# Patient Record
Sex: FEMALE | Race: BLACK OR AFRICAN AMERICAN | NOT HISPANIC OR LATINO | Employment: FULL TIME | URBAN - METROPOLITAN AREA
[De-identification: names, ages, dates, MRNs, and addresses within clinical notes are randomized per-mention and may not be internally consistent; named-entity substitution may affect disease eponyms.]

---
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
## 2020-01-25 NOTE — PROGRESS NOTES
[facility]         SUBJECTIVE   Kyle Steel is a 19 year old female with a PMH of:    Patient Active Problem List   Diagnosis     Chlamydia contact, treated     Patellofemoral disorder of left knee     presenting to clinic today for a STI check.  She states that she was tested for chlamydia and gonorrhea 1 week earlier and was negative at that time.  Since then, her partner has been having penile discharge and is being treated empirically for chlamydia and gonorrhea.  His results are not back yet.  She does not have any other partners but reports that he does.  She denies any symptoms.  She denies any vaginal discharge, pain or itching, or pelvic pain.    PMH, Medications and Allergies were reviewed and updated as needed.    ROS:  General: No fevers, chills  Head: No headache  CV: No chest pain or palpitations.  Resp: No shortness of breath.  No cough. No hemoptysis.  GI: No nausea, vomiting, constipation, diarrhea  : No urinary pains    Current Outpatient Medications   Medication Sig Dispense Refill     acetaminophen (TYLENOL) 325 MG tablet Take 1-2 tablets (325-650 mg) by mouth every 6 hours as needed for mild pain (Patient not taking: Reported on 8/23/2019) 100 tablet 0     etonogestrel (IMPLANON/NEXPLANON) 68 MG IMPL 1 each (68 mg) by Subdermal route continuous (Patient not taking: Reported on 8/23/2019)  0     ibuprofen (ADVIL/MOTRIN) 400 MG tablet Take 1 tablet (400 mg) by mouth every 8 hours as needed for moderate pain (Patient not taking: Reported on 8/23/2019) 120 tablet 0            OBJECTIVE:       Vitals:   Vitals:    09/23/19 1641   BP: 101/63   BP Location: Left arm   Patient Position: Sitting   Cuff Size: Adult Regular   Pulse: 82   Resp: 16   Temp: 98.4  F (36.9  C)   TempSrc: Oral   SpO2: 100%     Gen:  Well nourished and in no acute distress  HEENT: Extraocular movement intact.  CV:  RRR  - no murmurs noted   Pulm:  CTAB, no wheezes or crackles noted, good air entry   ABD:  Soft, nontender, no masses, no rebound, BS intact throughout, no hepatosplenomegaly  : Normal external genitalia, normal vaginal mucosa, normal-appearing cervix, copious white discharge  Extrem: No cyanosis, edema or clubbing  Psych: Euthymic           ASSESSMENT and PLAN:   Kyle was seen today for std and medication reconciliation.    Diagnoses and all orders for this visit:    Chlamydia contact  -     Wet Prep ([facility])  -     Chlamydia/Gono Amplified ([facility])      Return to clinic in as needed. Return sooner if develops new or worsening symptoms.    Options for treatment and/or follow-up care were reviewed with the patient was actively involved in the decision making process. Patient verbalized understanding and was in agreement with the plan.    The patient was seen by and discussed with Emerson Millan MD.    Rosalina Gilbert MD PGY2   [facility]

## 2020-02-26 ENCOUNTER — ALLIED HEALTH/NURSE VISIT (OUTPATIENT)
Dept: FAMILY MEDICINE | Facility: CLINIC | Age: 20
End: 2020-02-26
Payer: COMMERCIAL

## 2020-02-26 VITALS
TEMPERATURE: 98.5 F | WEIGHT: 132 LBS | OXYGEN SATURATION: 98 % | RESPIRATION RATE: 18 BRPM | BODY MASS INDEX: 20.67 KG/M2 | SYSTOLIC BLOOD PRESSURE: 107 MMHG | DIASTOLIC BLOOD PRESSURE: 67 MMHG | HEART RATE: 79 BPM

## 2020-02-26 DIAGNOSIS — Z30.42 ENCOUNTER FOR SURVEILLANCE OF INJECTABLE CONTRACEPTIVE: ICD-10-CM

## 2020-02-26 DIAGNOSIS — Z30.9 CONTRACEPTIVE MANAGEMENT: Primary | ICD-10-CM

## 2020-02-26 RX ORDER — MEDROXYPROGESTERONE ACETATE 150 MG/ML
150 INJECTION, SUSPENSION INTRAMUSCULAR
Status: ACTIVE | OUTPATIENT
Start: 2020-02-26

## 2020-02-26 RX ADMIN — MEDROXYPROGESTERONE ACETATE 150 MG: 150 INJECTION, SUSPENSION INTRAMUSCULAR at 12:01

## 2020-02-26 NOTE — NURSING NOTE
Clinic Administered Medication Documentation      Depo Provera Documentation    URINE HCG: not indicated    Depo-Provera Standing Order inclusion/exclusion criteria reviewed.   Patient meets: inclusion criteria     BP: 107/67  LAST PAP/EXAM: No results found for: PAP    Prior to injection, verified patient identity using patient's name and date of birth. Medication was administered. Please see MAR and medication order for additional information.     Was entire vial of medication used? Yes  Vial/Syringe: Single dose vial  Expiration Date:  1/21    Patient instructed to remain in clinic for 15 minutes.  NEXT INJECTION DUE: 5/13/20 - 5/27/20      Name of provider who requested the medication administration: Dr. Grier  Name of provider on site (faculty or community preceptor) at the time of performing the medication administration: Dr. Odonnell    Date of next administration: 5/14-6/11/20  Date of next office visit with provider to renew medication plan (must be seen annually): 12/9/20

## 2020-08-04 ENCOUNTER — OFFICE VISIT (OUTPATIENT)
Dept: FAMILY MEDICINE | Facility: CLINIC | Age: 20
End: 2020-08-04
Payer: COMMERCIAL

## 2020-08-04 VITALS
RESPIRATION RATE: 16 BRPM | SYSTOLIC BLOOD PRESSURE: 105 MMHG | DIASTOLIC BLOOD PRESSURE: 67 MMHG | WEIGHT: 132 LBS | OXYGEN SATURATION: 98 % | HEART RATE: 100 BPM | TEMPERATURE: 98.3 F | BODY MASS INDEX: 20 KG/M2 | HEIGHT: 68 IN

## 2020-08-04 DIAGNOSIS — K59.01 SLOW TRANSIT CONSTIPATION: ICD-10-CM

## 2020-08-04 DIAGNOSIS — Z86.19 HISTORY OF GONORRHEA: ICD-10-CM

## 2020-08-04 DIAGNOSIS — B00.9 HERPES SIMPLEX TYPE 2 INFECTION: ICD-10-CM

## 2020-08-04 DIAGNOSIS — Z32.00 PREGNANCY EXAMINATION OR TEST, PREGNANCY UNCONFIRMED: ICD-10-CM

## 2020-08-04 DIAGNOSIS — Z86.19 HISTORY OF CHLAMYDIA INFECTION: ICD-10-CM

## 2020-08-04 DIAGNOSIS — Z30.09 GENERAL COUNSELING FOR PRESCRIPTION OF ORAL CONTRACEPTIVES: Primary | ICD-10-CM

## 2020-08-04 PROBLEM — Z20.2 CHLAMYDIA CONTACT, TREATED: Status: RESOLVED | Noted: 2017-08-04 | Resolved: 2020-08-04

## 2020-08-04 PROBLEM — A54.9 GONORRHEA IN FEMALE: Status: RESOLVED | Noted: 2019-09-24 | Resolved: 2020-08-04

## 2020-08-04 PROBLEM — M22.2X2 PATELLOFEMORAL DISORDER OF LEFT KNEE: Status: RESOLVED | Noted: 2017-12-01 | Resolved: 2020-08-04

## 2020-08-04 PROBLEM — Z22.7 LTBI (LATENT TUBERCULOSIS INFECTION): Status: RESOLVED | Noted: 2019-12-03 | Resolved: 2020-08-04

## 2020-08-04 LAB — HCG UR QL: NEGATIVE

## 2020-08-04 RX ORDER — VALACYCLOVIR HYDROCHLORIDE 500 MG/1
500 TABLET, FILM COATED ORAL 2 TIMES DAILY
Qty: 6 TABLET | Refills: 0 | Status: SHIPPED | OUTPATIENT
Start: 2020-08-04 | End: 2021-07-20

## 2020-08-04 RX ORDER — DROSPIRENONE AND ETHINYL ESTRADIOL 0.03MG-3MG
1 KIT ORAL DAILY
Qty: 30 TABLET | Refills: 3 | Status: SHIPPED | OUTPATIENT
Start: 2020-08-04 | End: 2021-03-22

## 2020-08-04 RX ORDER — CALCIUM POLYCARBOPHIL 625 MG 625 MG/1
1 TABLET ORAL DAILY
Qty: 60 TABLET | Refills: 1 | Status: SHIPPED | OUTPATIENT
Start: 2020-08-04 | End: 2022-11-04

## 2020-08-04 RX ORDER — POLYETHYLENE GLYCOL 3350 17 G/17G
1 POWDER, FOR SOLUTION ORAL DAILY PRN
Qty: 507 G | Refills: 1 | Status: SHIPPED | OUTPATIENT
Start: 2020-08-04 | End: 2022-11-04

## 2020-08-04 ASSESSMENT — MIFFLIN-ST. JEOR: SCORE: 1410.87

## 2020-08-04 NOTE — PROGRESS NOTES
STEPAN Hitchcockkaty Steel is a 20 year old  female with a PMH significant for:     Patient Active Problem List   Diagnosis     Herpes simplex type 2 infection     She presents today for birth control consultation.    Patient is hoping to resume birth control today.  She has tried different options in the past including Nexplanon and Deppe shots.  She did not like either of these.  Per her report the Depo shot gave her cramps and heavy bleeding.  The Nexplanon gave her vaginal dryness and irritation.  She is not willing to try either of these today.  She is a non-smoker but has never tried a estrogen containing birth control.  No history of vascular disease, blood clots, hypertension, PCOS.  Is sexually active with one partner and is currently not using any form of contraception including barrier method.  Has a history of chlamydia and gonorrhea infections though it appears these were treated.  Is a little bit late today on her period, LMP estimated in the last week of June.  She did have a negative pregnancy test in clinic today.  She has had some cramps which make her feel like she is about to get her.  Though she is surprised she has not actually had any bleeding yet.  No vaginal discharge.    Also has a sore on the left side of her outer labia.  She had sexual intercourse this morning and states that it started to hurt after this.  She does not recall any trauma during this encounter.  Was not using any type of toy or in any unusual environment.  States she now has a red bump that is very tender to palpation and radiates down her leg.  Does have a history of herpes simplex 2 but this does not feel like an outbreak per her report.  Of note is not on any prophylaxis medication for her herpes outbreaks.  She is not concerned for STI, as above has had the same partner but does have a remote history of infection.    Would also like to discuss abdominal pain today.  She is had some periumbilical and left lower  quadrant pain for the past few days.  She states that it feels crampy in nature.  She does not have any dysuria or vaginal discharge.  Of note has a history of constipation states she has to bear down for about 30 minutes before she can poop.  She also only poops a few times a week, if that.  She has not noticed any darker discolorations in her stools.  Does not eat very much, states she does not have time and only eats 1 meal a day.    PMH, Medications and Allergies were reviewed and updated as needed.    Current Outpatient Medications:      acetaminophen (TYLENOL) 325 MG tablet, Take 1-2 tablets (325-650 mg) by mouth every 6 hours as needed for mild pain (Patient not taking: Reported on 8/23/2019), Disp: 100 tablet, Rfl: 0     etonogestrel (IMPLANON/NEXPLANON) 68 MG IMPL, 1 each (68 mg) by Subdermal route continuous (Patient not taking: Reported on 12/9/2019), Disp: , Rfl: 0     ibuprofen (ADVIL/MOTRIN) 400 MG tablet, Take 1 tablet (400 mg) by mouth every 8 hours as needed for moderate pain (Patient not taking: Reported on 8/23/2019), Disp: 120 tablet, Rfl: 0     rifampin (RIFADIN) 300 MG capsule, Take 2 capsules (600 mg) by mouth daily (Patient not taking: Reported on 12/9/2019), Disp: 60 capsule, Rfl: 3    Current Facility-Administered Medications:      medroxyPROGESTERone (DEPO-PROVERA) injection 150 mg, 150 mg, Intramuscular, Q90 Days, Kehinde Odonnell MD, 150 mg at 02/26/20 1201     medroxyPROGESTERone (DEPO-PROVERA) injection 150 mg, 150 mg, Intramuscular, Q90 Days, OfstedaReagan johnson MD, 150 mg at 12/09/19 1704    Review Of Systems  Skin: No new rashes or lesions  Eyes: No new changes in vision, no conjunctival irritation or erythema  Ears/Nose/Throat: No rhinorrhea or nasal congestion, no throat pain  Respiratory: No shortness of breath, dyspnea on exertion, cough, or hemoptysis  Cardiovascular: No chest pain or heart palpitations  Gastrointestinal: see HPI  Genitourinary: see HPI  Musculoskeletal: No  "joint edema, arthralgias or myalgias   Neurologic: No headache, tremor, numbness or weakness  Psychiatric: Moods have been stable   Hematologic/Lymphatic/Immunologic: No lymphadenopathy or unusual bleeding. No fevers, sweats, chills  Endocrine: No polyphagia or polydipsia           Physical Exam:     Vitals:    08/04/20 1524   BP: 105/67   BP Location: Right arm   Patient Position: Sitting   Cuff Size: Adult Regular   Pulse: 100   Resp: 16   Temp: 98.3  F (36.8  C)   TempSrc: Oral   SpO2: 98%   Weight: 59.9 kg (132 lb)   Height: 1.717 m (5' 7.6\")     Body mass index is 20.31 kg/m .    EXAM:  Constitutional: Healthy, alert and no distress  Neck: Neck supple. No adenopathy  Eyes: PERRLA, EOMI, conjunctiva are clear  ENT: No nasal discharge. Oropharynx clear with no erythema or exudates  Cardiovascular: Regular rate and rhythm. No murmurs, clicks gallops or rub  Respiratory: Normal respiratory rate and rhythm, lungs clear to auscultation. No wheezes or crackles  Abdomen:  +BS, soft, left lower quadrant tenderness without mass  : Chaperone offered and declined.  Left labia with erythematous edema, no lesion or papule.  Bimanual exam performed uterus normal size and no masses noted on either ovary  Extremities: No C/C/E in BLE. 2+DP pulses.  Joint exam without erythema, effusion and full ROM throughout  Skin: No rashes or lesion on exposed skin  Psychiatric: Mentation appears normal and affect normal/bright    No flowsheet data found.  PHQ-9 SCORE 12/9/2019   PHQ-9 Total Score 18     Results for orders placed or performed in visit on 08/04/20   HCG Qualitative Urine (UPT)  ([facility])     Status: None   Result Value Ref Range    HCG Qual Urine Negative Negative       Assessment and Plan     Kyle was seen today for contraception.    Diagnoses and all orders for this visit:    General counseling for prescription of oral contraceptives  Discussed birth control options in the exam room with the patient using the handout " provided at the [facility].  She does not want any type of NuvaRing as she finds vaginal inserts uncomfortable, has not tolerated shots or Nexplanon in the past.  Is interested in starting a combined OCP today.  Discussed that she would need to set a timer on her alarm phone to make sure that she takes this pill at the same time every day which she is agreeable to.  Also discussed that she will need to use protection for about 1 month after starting this for assurance as her body adapts.  She is somewhat interested in IUD but a little bit concerned about the invasiveness of this device at this time.  I talked her through how the procedure will go and she may choose to pursue this at a future date.  Negative pregnancy test today.  -     drospirenone-ethinyl estradiol (LIZ) 3-0.03 MG tablet; Take 1 tablet by mouth daily    Pregnancy examination or test, pregnancy unconfirmed  -     HCG Qualitative Urine (UPT)  ([facility])    Herpes simplex type 2 infection  Has history of herpes simplex 2 diagnosed by OB/GYN.  She has not had an outbreak for some time.  She states she can feel and these come on and does not have any prophylaxis medication.  She would like a prescription to have in case of an outbreak.  -     valACYclovir (VALTREX) 500 MG tablet; Take 1 tablet (500 mg) by mouth 2 times daily for 3 days    History of chlamydia infection  History of gonorrhea  History of positive results about 1 year ago we will recheck today to assure that the infection is cleared wspecially in the setting of abdominal pain and cramping  -     Chlamydia/Gono Amplified ([facility])    Slow transit constipation  At this time I am suspicious that her periumbilical and left lower quadrant pain is secondary to constipation as she endorses very poor bowel movement habits.  She is also gassy.  Will start a more judicious bowel regimen and follow-up as needed.  Her pain certainly could be attributed to something like an ovarian cyst and  starting an OCP should be helpful for this if that is the case.  -     calcium polycarbophil (FIBERCON) 625 MG tablet; Take 1 tablet (625 mg) by mouth daily  -     polyethylene glycol (MIRALAX) 17 GM/SCOOP powder; Take 17 g (1 capful) by mouth daily as needed for constipation      RTC if abdominal pain does not improve in coming weeks or if new or worsening symptoms.    Discussed with Dr. Lee Gilbert MD who is agreement with the assessment and plan.    Giulia Lyon MD PGY 2  [facility]      Patient Instructions   Birth control pill is at the pharmacy. Take this after your next period.    I recommend using back up contraception (condoms) for the first cycle to make sure you are tolerating the pills.    A antiviral has been sent to the pharmacy for when you get breakouts. You can always call to have this refilled.    Take the fiber pill (1-2) every day with lots of water. If you are unable to have a bowel movement drink a capful of Miralax.    If you don't feel better in the coming weeks call and come back to see me again.    Giulia Lyon MD        Options for treatment and/or follow-up care were reviewed with the patient. Kyle Steel was engaged and actively involved in the decision making process. She verbalized understanding of the options discussed and was satisfied with the final plan.

## 2020-08-04 NOTE — PROGRESS NOTES
Preceptor Attestation:    Patient seen and evaluated in person. I discussed the patient with the resident. I have verified the content of the note, which accurately reflects my assessment of the patient and the plan of care.   Supervising Physician:  eLe Gilbert MD.

## 2020-08-04 NOTE — PATIENT INSTRUCTIONS
Birth control pill is at the pharmacy. Take this after your next period.    I recommend using back up contraception (condoms) for the first cycle to make sure you are tolerating the pills.    A antiviral has been sent to the pharmacy for when you get breakouts. You can always call to have this refilled.    Take the fiber pill (1-2) every day with lots of water. If you are unable to have a bowel movement drink a capful of Miralax.    If you don't feel better in the coming weeks call and come back to see me again.    Giulia Lyon MD

## 2020-08-05 ENCOUNTER — TELEPHONE (OUTPATIENT)
Dept: FAMILY MEDICINE | Facility: CLINIC | Age: 20
End: 2020-08-05

## 2020-08-05 DIAGNOSIS — A74.9 CHLAMYDIA: ICD-10-CM

## 2020-08-05 LAB
C TRACH RRNA SPEC QL NAA+PROBE: POSITIVE
N GONORRHOEA RRNA SPEC QL NAA+PROBE: NEGATIVE

## 2020-08-05 RX ORDER — AZITHROMYCIN 500 MG/1
1000 TABLET, FILM COATED ORAL DAILY
Qty: 2 TABLET | Refills: 0 | Status: CANCELLED | COMMUNITY
Start: 2020-08-05

## 2020-08-05 RX ORDER — AZITHROMYCIN 500 MG/1
1000 TABLET, FILM COATED ORAL DAILY
Qty: 2 TABLET | Refills: 0 | COMMUNITY
Start: 2020-08-05 | End: 2020-09-21

## 2020-08-05 NOTE — TELEPHONE ENCOUNTER
Inquire about allergies and possibility of pregnancy in all patients.    Chlamydia:   Non-pregnant patients:    First line treatment:  azithromycin 1 gram po once    Second line (allergic to azithromycin):  doxycycline 100 mg po bid for 7 days    Third line options, ordered primarily based on ease of compliance and    tolerability:  1. Levofloxacin 500 mg po daily for 7 days  2. Ofloxacin 300 mg po bid for 7 days  3. Erythromycin base 500 mg po qid for 7 days   4. Erythromycin ethylsuccinate 800 mg qid for 7 days  Pregnant patients:   Azithromycin 1 gm once   Second line:  amoxicillin 500 mg po tid for 7 days   Third line:    1. Erythromycin base 500 mg po qid for 7 days    2. Erythromycin ethylsuccinate 800 mg po qid for 7 days  3. Erythromycin base 250 mg po qid for 14 days  4. Erthromycine ethylsuccinate 400 mg po qid for 14 days  Follow-up:    Non-pregnant:  Repeat testing in 3 months, sooner (not before 3 weeks) if symptoms persist after initial treatment, or patient is non-adherent. Also recommend testing for HIV and syphilis if not already done.    Pregnant:  test of cure recommended >3 weeks after treatment and repeated again in 3 months if in the first trimester at time of initial treatment    Counseling:    No sexual contact for 7 days after treatment with azithromycin is initiated or until after completion of antibiotics with other regimens.    All sexual partners within the past 60 days are recommended to be evaluated and treated by their provider or [facility] (which is free testing and treatment).  If no partner within 60 days, then the most recent sexual partner should be notified.     Notify patient that we are required by the [facility] to report all chlamydia infections, for the purpose of assistance with partner treatment.  We can list on this report the appropriate sexual partners that need to be treated, and the Dept of Health will contact them confidentially to  facilitate evaluation and treatment.    Source:  2010 CDC STD treatment guidelines. Updated with 2015 guidelines.

## 2020-09-21 DIAGNOSIS — A74.9 CHLAMYDIA: Primary | ICD-10-CM

## 2020-09-21 RX ORDER — AZITHROMYCIN 500 MG/1
1000 TABLET, FILM COATED ORAL DAILY
Qty: 2 TABLET | Refills: 0 | Status: SHIPPED | OUTPATIENT
Start: 2020-09-21 | End: 2021-07-20

## 2020-09-21 NOTE — PROGRESS NOTES
Patient diagnosed with chlamydia. Partner not able to obtain azithromycin so patient held on to her own azithromycin until he was treated. She lost her pills. Here with significant other to obtain expedited partner therapy. Medication sent to pharmacy.    Marianne Barbour MD  Discussed with Dr. Finn.

## 2021-06-19 NOTE — PROGRESS NOTES
[facility] Discharge Summary  Patient Name: Kyle Steel  Date: 8/9/2018  Referral Diagnosis: Patellofemoral disorder of left>right knee  Referring provider: Marcie Arreola MD  Visit Diagnosis:   1. Weakness of both lower extremities     2. Patellar malalignment syndrome, unspecified laterality     3. Chronic pain of both knees         Goals:  Status unknown/unmet;  Patient came for 2 of 6 prescribed visits.  Pt. will demonstrate/verbalize independence in self-management of condition in : 6 weeks  Pt. will be independent with home exercise program in : 6 weeks  Pt. will be able to walk : 60 minutes;with less pain;with less difficulty;for community mobility;for exercise/recreation;in 6 weeks;Comment  Comment:: less clicking and sensation of dragging leg sensation  Patient will sit: 60 minutes;for work;with less pain;in 6 weeks  Patient will increase : LEFS score;by _ points;for improved quality of function;for improved quality of life;in 6 weeks  by ___ points: 9    Patient was seen for 2 visits from 7/6/2018 to 7/10/2018 with one no show appointment and 3 outstanding appointments cancelled by her mother.  The patient attended therapy initially, but did not finish the therapy sessions prescribed.  Goals were not fully achieved. Explanation for goals not achieved: patient came for only 2 of 6 prescribed visits.  Patient received a home program LE stretches, pelvic/core/quad/HS strengthening  The patient discontinued therapy, did not return.  The patient was issued kinesiotape/theraband and instructed in proper usage.    Therapy will be discontinued at this time.  The patient will need a new referral to resume.    Thank you for your referral.  Selma Herrera  8/9/2018  10:01 AM      [facility] Daily Progress     Patient Name: Kyle Stele  PRECAUTIONS/RESTRICTIONS:  none  Date: 7/10/2018  Visit #: 2  PTA visit #:  0  Referral Diagnosis: Patellofemoral disorder of left>right  "knee  Referring provider: Marcie Arreola MD  Visit Diagnosis:     ICD-10-CM    1. Weakness of both lower extremities R29.898    2. Patellar malalignment syndrome, unspecified laterality M23.90    3. Chronic pain of both knees M25.561     M25.562     G89.29          Assessment:     HEP/POC compliance is  good .  Patient demonstrates understanding/independence with home program.  Response to Intervention limited but able to perform quad set without towel roll and SLR without pain  Patient is appropriate to continue with skilled physical therapy intervention, as indicated by initial plan of care.    Goal Status:  Pt. will demonstrate/verbalize independence in self-management of condition in : 6 weeks  Pt. will be independent with home exercise program in : 6 weeks  Pt. will be able to walk : 60 minutes;with less pain;with less difficulty;for community mobility;for exercise/recreation;in 6 weeks;Comment  Comment:: less clicking and sensation of dragging leg sensation  Patient will sit: 60 minutes;for work;with less pain;in 6 weeks  Patient will increase : LEFS score;by _ points;for improved quality of function;for improved quality of life;in 6 weeks  by ___ points: 9    Plan / Patient Education:     Continue with initial plan of care.  Progress with home program as tolerated.  see 1x/week for 4 more visits then reassess progress/needs.  Next:  Progressively add ,SL hip abduction/adduction, closed chain HS,Don Tiegney TKE, Step ups, SLS  Subjective:     Pain Ratin  Quite sore today but doesn't report any pain with exercises.  Removed tape  night.  Slight irritation  Compliant with HEP and no questions or difficulties    Response to PT/benefits:  None noted  Able to perform exercises without pain    Continued difficulties:  walking1-2 hours, sitting 30-60 minutes    Objective:     Gait:  Antalgic.  Today's Exercises:  OPT EXERCISES 7/10/2018   Exercise #1 Quad set without towel roll to 0o, 3x5\"-reviewed " "  Comment #1 SLR supine to 8\" in neutral then in ER, x2 each-reinstructed   Exercise #2 Clamshell, GTB 10x each-progressed   Comment #2 Bridge with pillow squeeze, 10x-DC   Exercise #3    Comment #3 Bridge marching, 5x-H   Exercise #4 Standing ITB stretch, 10\"x1 each-H   Comment #4 ITB SL trial   Exercise #5 Quad stretch standing, foot/leg on stool, 10\"x1 each-H   Tolerated ex well but pain with Don Janey TKE with GTB.      Treatment Today    TREATMENT MINUTES COMMENTS   Evaluation     Self-care/ Home management     Manual therapy     Neuromuscular Re-education     Therapeutic Activity     Therapeutic Exercises 25 See flow sheet   Gait training     Modality__________________                Total 25    Blank areas are intentional and mean the treatment did not include these items.       Selma Herrera  7/10/2018    "

## 2021-06-19 NOTE — PROGRESS NOTES
[facility] Certification Request    July 6, 2018      Patient: Kyle Steel  MR Number: [id number]  YOB: 2000  Date of Visit: 7/6/2018      Dear Dr. Marcie Arreola    Thank you for this referral.   We are seeing Kyle Steel for Physical Therapy of Left>Right Patellofemoral Pain Syndrome.    Medicare and/or Medicaid requires physician review and approval of the treatment plan. Please review the plan of care and verify that you agree with the therapy plan of care by co-signing this note.      Plan of Care  Authorization / Certification Start Date: 07/06/18  Authorization / Certification End Date: 08/17/18  Authorization / Certification Number of Visits: 6  Communication with: Referral Source  Patient Related Instruction: Nature of Condition;Treatment plan and rationale;Self Care instruction;Basis of treatment;Precautions;Next steps;Expected outcome  Times per Week: 1  Number of Weeks: 6  Number of Visits: 6  Discharge Planning: Independent HEP and self-management of symptom  Precautions / Restrictions : None  Therapeutic Exercise: Stretching;Strengthening  Neuromuscular Reeducation: kinesio tape;balance/proprioception;core  Manual Therapy: myofascial release  Modalities: other  Modalities: Disregard  Equipment: theraband;other  Equipment: kinesiotape    Goals:  Pt. will demonstrate/verbalize independence in self-management of condition in : 6 weeks  Pt. will be independent with home exercise program in : 6 weeks  Pt. will be able to walk : 60 minutes;with less pain;with less difficulty;for community mobility;for exercise/recreation;in 6 weeks;Comment  Comment:: less clicking and sensation of dragging leg sensation  Patient will sit: 60 minutes;for work;with less pain;in 6 weeks  Patient will increase : LEFS score;by _ points;for improved quality of function;for improved quality of life;in 6 weeks  by ___ points: 9      If you have any questions or concerns, please don't hesitate to  call.    Sincerely,      Selma Herrera, PT        Physician recommendation:     ___ Follow therapist's recommendation        ___ Modify therapy      *Physician co-signature indicates they certify the need for these services furnished within this plan and while under their care.            [facility]   Knee Initial Evaluation    Patient Name: Kyle Steel   Precautions/Restrictions:  None  Date of evaluation: 7/6/2018  Referral Diagnosis: Patellofemoral disorder of left>right knee  Referring provider: Marcie Arreola MD  Visit Diagnosis:     ICD-10-CM    1. Weakness of both lower extremities R29.898    2. Knee pain, bilateral M25.561     M25.562    3. Patellar malalignment syndrome, unspecified laterality M23.90    4. Chronic pain of both knees M25.561     M25.562     G89.29        Assessment:      Skilled PT is required to decrease pain, increase strength/function through exercise and education  Pt. is appropriate for skilled PT intervention as outlined in the Plan of Care (POC).  Pt. is a good candidate for skilled PT services to improve pain levels and function.    Goals:  Pt. will demonstrate/verbalize independence in self-management of condition in : 6 weeks  Pt. will be independent with home exercise program in : 6 weeks  Pt. will be able to walk : 60 minutes;with less pain;with less difficulty;for community mobility;for exercise/recreation;in 6 weeks;Comment  Comment:: less clicking and sensation of dragging leg sensation  Patient will sit: 60 minutes;for work;with less pain;in 6 weeks  Patient will increase : LEFS score;by _ points;for improved quality of function;for improved quality of life;in 6 weeks  by ___ points: 9    Patient's expectations/goals are realistic.    Barriers to Learning or Achieving Goals:  Chronicity of the problem.       Plan / Patient Instructions:      Plan of Care:   Authorization / Certification Start Date: 07/06/18  Authorization / Certification End Date:  08/17/18  Authorization / Certification Number of Visits: 6  Communication with: Referral Source  Patient Related Instruction: Nature of Condition;Treatment plan and rationale;Self Care instruction;Basis of treatment;Precautions;Next steps;Expected outcome  Times per Week: 1  Number of Weeks: 6  Number of Visits: 6  Discharge Planning: Independent HEP and self-management of symptom  Precautions / Restrictions : None  Therapeutic Exercise: Stretching;Strengthening  Neuromuscular Reeducation: kinesio tape;balance/proprioception;core  Manual Therapy: myofascial release  Modalities: other  Modalities: Disregard  Equipment: theraband;other  Equipment: kinesiotape    Plan of care and goals were established in collaboration with patient.    Plan for next visit: see 1x/week for 5 more visits then reassess progress/needs.  Next:  Progressively add ITB stretch, quad stetch,SL hip abduction, closed chain HS,Don Tiegney TKE, Step ups, SLS     Subjective:        Social information:   Living Situation:single family home and lives with others    Occupation:Summer Camp  at [employer]   Work Status:Working part time   Equipment Available: None    History of Present Illness:    Kyle is a 17 y.o. female who presents to therapy today with complaints of left>>right knee pain.. Date of onset/duration of symptoms is Feb 2017 noted with doing a flip in gymnastic. Onset was gradual. Symptoms are intermittent. She reports  a constant  history of similar symptoms since onset but none prior to Feb 2017. She describes their previous level of function as not limited    Pain Rating:3  Pain rating at best: 0  Pain rating at worst: 4  Pain description:ache/throb    Functional limitations are described as occurring with:   walking1-2 hours, sitting 30-60 minutes    Patient reports no benefit from  from any measures.       Objective:      Note: Items left blank indicates the item was not performed or not indicated at the time  of the evaluation.    Patient Outcome Measures :    Lower Extremity Functional Scale (_/80): 52   Scores range from 0-80, where a score of 80 represents maximum function. The minimal clinically important difference is a positive change of 9 points.    Knee Examination  1. Weakness of both lower extremities     2. Knee pain, bilateral     3. Patellar malalignment syndrome, unspecified laterality     4. Chronic pain of both knees       Precautions/Restrictions:  None  Involved Side: left>>right  Posture Observation: Standing:  C-protraction, increased thoracic kyphosis, right shoulder/scap/left iliac crest high, left knee extended, mild bilat pes planus     General sitting posture is  poor slouched.  Assistive Device: None  Gait Observation: normal  Lumbar Clearing: not tested  Hip Clearing: Not tested    Knee ROM Within normal limits unless otherwise indicated     Date:  7/6/2018      AROM in degrees  Right   Left  Right   Left  Right   Left       Knee Flexion  (130 )   130   130                   Knee Extension  (0 )   +3   +3 pain                 PROM in degrees  Right   Left  Right   Left  Right   Left       Knee Flexion  (130 )   135   130 pain                   Knee Extension  (0 )   +3   +10                 LE Strength    Within normal limits unless otherwise indicated               Date:  7/6/2018     Strength (MMT/5)  Right   Left  Right   Left  Right   Left       Hip Flexion   5 5                   Hip Abduction   4   4                   Hip Adduction                         Hip Extension                         Hip Internal Rotation 5   5-                   Hip External Rotation   5   5-                   Knee Extension   5   5                   Knee Flexion   4   4                   Ankle Dorsiflexion                         Ankle Plantarflexion                       Flexibility:  Gastrocsoleus/HS WNL    Palpation:  Mild tenderness left superior medial patella and medial quad    No apparent edema    Knee  "Special Tests (+/-):  7/6/2018    Knee OA Cluster   Right   Left   Ligament Tests   Right   Left    1. > 49 y/o           Lachman   -   -    2. Stiffness > 30 min.           Anterior Drawer   -   -    3. Crepitus           Posterior Drawer   -   -    4. Bony tenderness           Posterior Sag          5. Bone enlargement           Valgus Stress   -   -    6. No warmth to the touch           Varus Stress   -   -     Meniscal Tests   Right   Left    Other   Right    Left       Louise's           Hugomor's             Joint line tenderness           Adalgisa             Thessaly Thomas Apley's                      Today's exercises:  Exercises:  Exercise #1: Quad set with towel roll to 0o, 5x5\"-H  Comment #1: SLR supine to 8\", x5 each- verbal to try in slight ER-H  Exercise #2: Clamshell, 10x each-H  Comment #2: Bridge with pillow squeeze, 10x-H  Exercise #3: Kinesiotape for medial quad, skin prep applied, wear/care instructed; HO issued.    Discomfort with recurvatum on left otherwise tolerated exercises well.    Treatment Today   7/6/2018    TREATMENT MINUTES COMMENTS   Evaluation 30 Knee   Self-care/ Home management     Manual therapy     Neuromuscular Re-education     Therapeutic Activity     Therapeutic Exercises 23 See flow sheet   Gait training     Modality__________________                Total 53    Blank areas are intentional and mean the treatment did not include these items.     PT Evaluation Code: (Please list factors)  Patient History/Comorbidities: see above  Examination: see above  Clinical Presentation: stable  Clinical Decision Making: low    Patient History/  Comorbidities Examination  (body structures and functions, activity limitations, and/or participation restrictions) Clinical Presentation Clinical Decision Making (Complexity)   No documented Comorbidities or personal factors 1-2 Elements Stable and/or uncomplicated Low   1-2 documented comorbidities or personal factor 3 Elements " Evolving clinical presentation with changing characteristics Moderate   3-4 documented comorbidities or personal factors 4 or more Unstable and unpredictable High                Selma Herrera  7/6/2018  9:30 AM

## 2021-07-20 ENCOUNTER — OFFICE VISIT (OUTPATIENT)
Dept: FAMILY MEDICINE | Facility: CLINIC | Age: 21
End: 2021-07-20
Payer: COMMERCIAL

## 2021-07-20 VITALS
DIASTOLIC BLOOD PRESSURE: 66 MMHG | TEMPERATURE: 99.5 F | SYSTOLIC BLOOD PRESSURE: 91 MMHG | OXYGEN SATURATION: 98 % | WEIGHT: 140 LBS | HEART RATE: 102 BPM | BODY MASS INDEX: 21.54 KG/M2 | RESPIRATION RATE: 16 BRPM

## 2021-07-20 DIAGNOSIS — J02.0 STREP THROAT: Primary | ICD-10-CM

## 2021-07-20 DIAGNOSIS — B00.9 HERPES SIMPLEX TYPE 2 INFECTION: ICD-10-CM

## 2021-07-20 PROCEDURE — 99213 OFFICE O/P EST LOW 20 MIN: CPT | Performed by: FAMILY MEDICINE

## 2021-07-20 RX ORDER — AMOXICILLIN 500 MG/1
1000 CAPSULE ORAL 2 TIMES DAILY
Qty: 28 CAPSULE | Refills: 0 | Status: SHIPPED | OUTPATIENT
Start: 2021-07-20 | End: 2021-07-27

## 2021-07-20 RX ORDER — VALACYCLOVIR HYDROCHLORIDE 500 MG/1
500 TABLET, FILM COATED ORAL 2 TIMES DAILY
Qty: 6 TABLET | Refills: 11 | Status: SHIPPED | OUTPATIENT
Start: 2021-07-20 | End: 2022-11-04

## 2021-07-20 NOTE — PROGRESS NOTES
Assessment & Plan     Herpes simplex type 2 infection  No outbreak at this time but she like to have prescriptions to take for future outbreaks.  This was given with instructions on proper use of this medication.  - valACYclovir (VALTREX) 500 MG tablet; Take 1 tablet (500 mg) by mouth 2 times daily for 3 days    Strep throat  Patient likely has strep throat with a fever, marked pharyngitis and tonsillitis, absence of cough.  We will treat empirically.  - amoxicillin (AMOXIL) 500 MG capsule; Take 2 capsules (1,000 mg) by mouth 2 times daily for 7 days    25 minutes spent on the date of the encounter doing patient visit and documentation       Vini Li MD  [facility]    Options for treatment and/or follow-up care were reviewed with the patient. Kyle Steel was engaged and actively involved in the decision making process. She verbalized understanding of the options discussed and was satisfied with the final plan.    Subjective: Kyle Steel is a 20 year old coming in with 2 concerns.  She is wanting a refill on medications for treating herpes simplex.  She is taken Valtrex before for recurrent outbreaks and is needing a refill of that.    She also has felt febrile now for 2 days.  She has had a sore throat and a weird feeling when she swallows.  She states that she has not had a rash where she had a cough.  She is not been exposed to anyone with similar symptoms.  She does have a frontal headache that she states is different from her usual migraines.  She does have some chills.  PMHX/PSHX/MEDS/ALLERGIES/SHX/FHX reviewed and updated in Epic.   ROS:   Resp: No shortness of breath. No cough. No hemoptysis.   GI: No nausea or vomiting.    Objective: BP 91/66   Pulse 102   Temp 99.5  F (37.5  C) (Oral)   Resp 16   Wt 63.5 kg (140 lb)   SpO2 98%   BMI 21.54 kg/m     Gen: Well nourished and in NAD   HEENT: Pharyngitis and tonsillitis noted bilaterally.  Tonsils are 3+.  CV: Mild  tachycardia without murmur  Pulm: Clear to auscultation without wheezing or crackles  ABD: soft, nontender, BS intact  Extrem: no cyanosis, edema or clubbing   Psych: Euthymic

## 2021-07-20 NOTE — LETTER
RETURN TO WORK/SCHOOL FORM    7/20/2021    Re: Kyle Steel  2000      To Whom It May Concern:     Kyle Steel was seen in clinic today. She may return to work without restrictions on Thursday 7/22/21                Vini Li MD  7/20/2021 2:44 PM

## 2021-09-15 ENCOUNTER — OFFICE VISIT (OUTPATIENT)
Dept: FAMILY MEDICINE | Facility: CLINIC | Age: 21
End: 2021-09-15
Payer: COMMERCIAL

## 2021-09-15 VITALS
OXYGEN SATURATION: 99 % | TEMPERATURE: 98.1 F | RESPIRATION RATE: 18 BRPM | HEART RATE: 67 BPM | SYSTOLIC BLOOD PRESSURE: 99 MMHG | WEIGHT: 143.6 LBS | BODY MASS INDEX: 22.09 KG/M2 | DIASTOLIC BLOOD PRESSURE: 68 MMHG

## 2021-09-15 DIAGNOSIS — F33.9 RECURRENT MAJOR DEPRESSIVE DISORDER, REMISSION STATUS UNSPECIFIED (H): Primary | ICD-10-CM

## 2021-09-15 DIAGNOSIS — F41.9 ANXIETY: ICD-10-CM

## 2021-09-15 PROCEDURE — 99213 OFFICE O/P EST LOW 20 MIN: CPT | Mod: GC

## 2021-09-15 RX ORDER — SERTRALINE HYDROCHLORIDE 25 MG/1
25 TABLET, FILM COATED ORAL DAILY
Qty: 30 TABLET | Refills: 0 | Status: CANCELLED | OUTPATIENT
Start: 2021-09-15

## 2021-09-15 ASSESSMENT — ANXIETY QUESTIONNAIRES
1. FEELING NERVOUS, ANXIOUS, OR ON EDGE: NEARLY EVERY DAY
3. WORRYING TOO MUCH ABOUT DIFFERENT THINGS: NEARLY EVERY DAY
GAD7 TOTAL SCORE: 18
5. BEING SO RESTLESS THAT IT IS HARD TO SIT STILL: NEARLY EVERY DAY
7. FEELING AFRAID AS IF SOMETHING AWFUL MIGHT HAPPEN: NOT AT ALL
6. BECOMING EASILY ANNOYED OR IRRITABLE: NEARLY EVERY DAY
2. NOT BEING ABLE TO STOP OR CONTROL WORRYING: NEARLY EVERY DAY

## 2021-09-15 ASSESSMENT — PATIENT HEALTH QUESTIONNAIRE - PHQ9
5. POOR APPETITE OR OVEREATING: NEARLY EVERY DAY
SUM OF ALL RESPONSES TO PHQ QUESTIONS 1-9: 22

## 2021-09-15 NOTE — PATIENT INSTRUCTIONS
Thank you for trusting us with your care today. We discussed having you set up an appt with our mental health providers so that we can start working on your mental health goals. Please also schedule a follow up appt with me or your PCP.

## 2021-09-15 NOTE — PROGRESS NOTES
"    Assessment & Plan     Recurrent major depressive disorder, remission status unspecified (H)  Anxiety  Patient has had depressive symptoms since 2018. Never treated with medication or psychotherapy. Score of 22 on PHQ-9 assessment. Patient requests emotional support animal form filled out for her future residential apartment in November. Discussed with patient that emotional support animal may not be the best treatment option for her at this time. We will try therapy first as she is reluctant to try medications at this time. Will also consider low dose zoloft to augment therapy.    -referral to mental health services placed   -patient instructed to stop at  to make appt.       Return in about 4 weeks (around 10/13/2021), or if symptoms worsen or fail to improve, for Follow up.    Rudi Barakat MD  [facility] JESSICA Wright is a 21 year old who presents for the following health issues: requesting support animal    HPI     Patient is a 20yo F with no significant past medical history coming in for evaluation of a need for support animal. She has had depressive symptoms since 2018. Worsened with life stressors and being alone. Patient was homeless July-October 2020. Was sleeping in her car. prev living with grandfather who moved in with her mother which patient has had a migdalia relationship with. Mother is alcoholic. Patient started work at the age of 14 due to mother being absent. Reports family history of substance abuse. Patient says she tries hard to not take medications or substances because her family has \"addictive gene\". Currently she denies any suicidal ideation or attempts. No history of suicide attempts. Patient has never tried medication for depression, had referral for therapy but failed through, would consider pyschotherapy in the future. Housing program also has counselors for mental health needs. Patient is very aware when she needs additional support. 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 She likes to help people.    History of panic attacks, patient goes for walks to calm down. Would like a puppy to help her control the anxiety and depression worsened with living alone.       Review of Systems   Constitutional, HEENT, cardiovascular, pulmonary, gi and gu systems are negative, except as otherwise noted.      Objective    BP 99/68   Pulse 67   Temp 98.1  F (36.7  C) (Oral)   Resp 18   Wt 65.1 kg (143 lb 9.6 oz)   LMP 08/31/2021 (Approximate)   SpO2 99%   BMI 22.09 kg/m    Body mass index is 22.09 kg/m .  Physical Exam   GENERAL: healthy, alert and no distress  NECK: no adenopathy, no asymmetry, masses, or scars and thyroid normal to palpation  RESP: lungs clear to auscultation - no rales, rhonchi or wheezes  CV: regular rate and rhythm, normal S1 S2, no S3 or S4, no murmur, click or rub, no peripheral edema and peripheral pulses strong  ABDOMEN: soft, nontender, no hepatosplenomegaly, no masses and bowel sounds normal  MS: no gross musculoskeletal defects noted, no edema    PHQ 9/15/2021   PHQ-9 Total Score 22   Q9: Thoughts of better off dead/self-harm past 2 weeks Not at all

## 2021-09-15 NOTE — PROGRESS NOTES
Preceptor Attestation:    I discussed the patient with the resident and evaluated the patient in person. I have verified the content of the note, which accurately reflects my assessment of the patient and the plan of care.   Supervising Physician:  Berna Flores MD.

## 2021-09-15 NOTE — NURSING NOTE
Primary Care PTSD Screen    In your life, have you ever had any experience that was so frightening, horrible, or upsetting that, in the past month, you...    1.) Have had nightmares about it or thought about it when you did not want to? NO    2.) Tried hard not to think about it or went out of your way to avoid situations that remind you of it? NO    3.) Were constantly on guard, watchful, or easily startled? NO    4.) Felt numb or detached from others, activities, or your surroundings? NO        Bipolar Screening    Have you experienced sustained periods of feeling uncharacteristically energetic? YES    Have you had periods of not sleeping, but not feeling tired? YES    Have you felt that your thoughts were racing and couldn't be slowed down? YES    Have you had periods where you were excessive in sexual interest, spending money, or taking unusual risks? YES

## 2021-09-16 ENCOUNTER — DOCUMENTATION ONLY (OUTPATIENT)
Dept: PSYCHOLOGY | Facility: CLINIC | Age: 21
End: 2021-09-16
Payer: COMMERCIAL

## 2021-09-16 ASSESSMENT — ANXIETY QUESTIONNAIRES: GAD7 TOTAL SCORE: 18

## 2021-09-16 NOTE — PROGRESS NOTES
Behavioral Health Team,    Patient is being referred for mental health services by their provider, Dr. Barakat.  Please advise if we are able to see patient for in house treatment or if a community option would be best.    Thank you,    Bianca Dominguez  9/16/2021

## 2021-09-21 NOTE — PROGRESS NOTES
Review of Dr. Barakat's order and note indicates that this is a referral for psychiatry to address depressed mood.  Review of EPIC indicates one prior MH referral from Dr. Gilbert 12/9/19 for therapy to address anxiety but patient did not follow up at that time.  Ms. Steel did request letter for emotional support animal letter at the visit with Dr. Barakat but this was deferred.  Although MH order requests psychiatry, review of the note indicates that Dr. Barakat discussed scheduling therapy at [facility].  Dr. Barakat also recommended f/u with her around 10/13/21 but this is not scheduled.  Ms. Steel does have a history of homelessness and current housing program does have MH counselors available if needed per Dr. Barakat's note.  No safety concerns noted at the last visit.    PHQ 12/9/2019 9/15/2021   PHQ-9 Total Score 18 22   Q9: Thoughts of better off dead/self-harm past 2 weeks Not at all Not at all     Given this history, I am wondering how motivated Ms. Steel may be for therapy at this time, but will ask our referral team to reach out to her to see if she is interested in scheduling for therapy with one of the fellows.  We should be clear if the question arises, that attending therapy at [facility] does not guarantee support for an emotional support animal but that could be discussed down the road once care is well established (several attended visits).  Both fellows appear to have openings within the next couple of weeks.  Will also ask that our referral team encourage scheduling recommended follow up with Dr. Barakat at the time of outreach.    Let me know if you have questions or would like additional follow up from me. Thanks!      Eliane Kennedy, Ph.D.,     Disclaimer  The above treatment recommendations are based on consultation with the patient's primary care provider and a review of relevant information in EPIC. I have not personally examined the patient. All recommendations should be implemented with  considerations of the patient's relevant prior history and current clinical status. Please contact me with any questions about the care of this patient.

## 2021-09-30 NOTE — PROGRESS NOTES
09/30/21- no answer, lvm     11/29/21- no answer, lvm. Currently our fellows schedules are full. If patient returns call I will offer follow up appt with PCP during a time a  provider is in [place].     Bianca Dominguez

## 2022-03-23 ENCOUNTER — HOSPITAL ENCOUNTER (EMERGENCY)
Facility: CLINIC | Age: 22
Discharge: HOME | End: 2022-03-23
Attending: EMERGENCY MEDICINE | Admitting: EMERGENCY MEDICINE
Payer: COMMERCIAL

## 2022-03-23 VITALS
TEMPERATURE: 98 F | DIASTOLIC BLOOD PRESSURE: 65 MMHG | BODY MASS INDEX: 22.31 KG/M2 | HEART RATE: 87 BPM | SYSTOLIC BLOOD PRESSURE: 108 MMHG | WEIGHT: 145 LBS | RESPIRATION RATE: 16 BRPM | OXYGEN SATURATION: 100 %

## 2022-03-23 DIAGNOSIS — R10.84 ABDOMINAL PAIN, GENERALIZED: ICD-10-CM

## 2022-03-23 DIAGNOSIS — K59.00 CONSTIPATION, UNSPECIFIED CONSTIPATION TYPE: ICD-10-CM

## 2022-03-23 LAB
ALBUMIN SERPL-MCNC: 4.1 G/DL (ref 3.4–5)
ALP SERPL-CCNC: 54 U/L (ref 40–150)
ALT SERPL W P-5'-P-CCNC: 20 U/L (ref 0–50)
ANION GAP SERPL CALCULATED.3IONS-SCNC: 6 MMOL/L (ref 3–14)
AST SERPL W P-5'-P-CCNC: 19 U/L (ref 0–45)
BASOPHILS # BLD AUTO: 0 10E3/UL (ref 0–0.2)
BASOPHILS NFR BLD AUTO: 1 %
BILIRUB SERPL-MCNC: 0.7 MG/DL (ref 0.2–1.3)
BUN SERPL-MCNC: 7 MG/DL (ref 7–30)
CALCIUM SERPL-MCNC: 9.4 MG/DL (ref 8.5–10.1)
CHLORIDE BLD-SCNC: 108 MMOL/L (ref 94–109)
CO2 SERPL-SCNC: 25 MMOL/L (ref 20–32)
CREAT SERPL-MCNC: 0.8 MG/DL (ref 0.52–1.04)
EOSINOPHIL # BLD AUTO: 0 10E3/UL (ref 0–0.7)
EOSINOPHIL NFR BLD AUTO: 1 %
ERYTHROCYTE [DISTWIDTH] IN BLOOD BY AUTOMATED COUNT: 13.2 % (ref 10–15)
GFR SERPL CREATININE-BSD FRML MDRD: >90 ML/MIN/1.73M2
GLUCOSE BLD-MCNC: 90 MG/DL (ref 70–99)
HCG SERPL QL: NEGATIVE
HCT VFR BLD AUTO: 41 % (ref 35–47)
HGB BLD-MCNC: 12.8 G/DL (ref 11.7–15.7)
HOLD SPECIMEN: NORMAL
IMM GRANULOCYTES # BLD: 0 10E3/UL
IMM GRANULOCYTES NFR BLD: 0 %
LIPASE SERPL-CCNC: 56 U/L (ref 73–393)
LYMPHOCYTES # BLD AUTO: 1.7 10E3/UL (ref 0.8–5.3)
LYMPHOCYTES NFR BLD AUTO: 40 %
MCH RBC QN AUTO: 28.3 PG (ref 26.5–33)
MCHC RBC AUTO-ENTMCNC: 31.2 G/DL (ref 31.5–36.5)
MCV RBC AUTO: 91 FL (ref 78–100)
MONOCYTES # BLD AUTO: 0.4 10E3/UL (ref 0–1.3)
MONOCYTES NFR BLD AUTO: 10 %
NEUTROPHILS # BLD AUTO: 2 10E3/UL (ref 1.6–8.3)
NEUTROPHILS NFR BLD AUTO: 48 %
NRBC # BLD AUTO: 0 10E3/UL
NRBC BLD AUTO-RTO: 0 /100
PLATELET # BLD AUTO: 272 10E3/UL (ref 150–450)
POTASSIUM BLD-SCNC: 3.4 MMOL/L (ref 3.4–5.3)
PROT SERPL-MCNC: 7.7 G/DL (ref 6.8–8.8)
RBC # BLD AUTO: 4.53 10E6/UL (ref 3.8–5.2)
SODIUM SERPL-SCNC: 139 MMOL/L (ref 133–144)
WBC # BLD AUTO: 4.2 10E3/UL (ref 4–11)

## 2022-03-23 PROCEDURE — 85025 COMPLETE CBC W/AUTO DIFF WBC: CPT | Performed by: EMERGENCY MEDICINE

## 2022-03-23 PROCEDURE — 82040 ASSAY OF SERUM ALBUMIN: CPT | Performed by: EMERGENCY MEDICINE

## 2022-03-23 PROCEDURE — 80053 COMPREHEN METABOLIC PANEL: CPT | Performed by: EMERGENCY MEDICINE

## 2022-03-23 PROCEDURE — 36415 COLL VENOUS BLD VENIPUNCTURE: CPT | Performed by: EMERGENCY MEDICINE

## 2022-03-23 PROCEDURE — 250N000013 HC RX MED GY IP 250 OP 250 PS 637: Performed by: EMERGENCY MEDICINE

## 2022-03-23 PROCEDURE — 83690 ASSAY OF LIPASE: CPT | Performed by: EMERGENCY MEDICINE

## 2022-03-23 PROCEDURE — 99283 EMERGENCY DEPT VISIT LOW MDM: CPT

## 2022-03-23 PROCEDURE — 84703 CHORIONIC GONADOTROPIN ASSAY: CPT | Performed by: EMERGENCY MEDICINE

## 2022-03-23 RX ORDER — MAGNESIUM CARB/ALUMINUM HYDROX 105-160MG
296 TABLET,CHEWABLE ORAL ONCE
Qty: 296 ML | Refills: 0 | Status: SHIPPED | OUTPATIENT
Start: 2022-03-23 | End: 2022-03-23

## 2022-03-23 RX ADMIN — DOCUSATE SODIUM 286 ML: 50 LIQUID ORAL at 09:34

## 2022-03-23 ASSESSMENT — ENCOUNTER SYMPTOMS
DIARRHEA: 0
VOMITING: 0
SHORTNESS OF BREATH: 0
ABDOMINAL DISTENTION: 1
NAUSEA: 1
ABDOMINAL PAIN: 1
CONSTIPATION: 1

## 2022-03-23 NOTE — ED TRIAGE NOTES
Pt presents for constipation. No Bm in 3 days. Chronic issues, we seen by UC. Told to take miralax by UC provided. Did not work.

## 2022-03-23 NOTE — ED PROVIDER NOTES
History     Chief Complaint:  Constipation      HPI   Kyle Steel is a 21 year old female who presents with abdominal pain and concerns about constipation.  She is been having pain over the past 6 days. It has been 3 days since last having a very small bowel movement after giving herself an enema.  She notes that she is feel bloated and distended.  She has nausea.  She has pain across her mid abdomen.  No fevers or chills.  She cannot remember her last normal bowel movement but typically gets her bowel movements when she has her menstrual cycle.  Denies any pain or burning with urination.  Has not had any vaginal discharge and is recently been treated for chlamydia.  She denies any prior abdominal surgeries.    Review of Systems   Respiratory: Negative for shortness of breath.    Cardiovascular: Negative for chest pain.   Gastrointestinal: Positive for abdominal distention, abdominal pain, constipation and nausea. Negative for diarrhea and vomiting.   All other systems reviewed and are negative.      Allergies:  Nka [No Known Allergies]      Medications:    calcium polycarbophil (FIBERCON) 625 MG tablet  polyethylene glycol (MIRALAX) 17 GM/SCOOP powder  OCNNER 3-0.03 MG tablet  valACYclovir (VALTREX) 500 MG tablet        Past Medical History:    Past Medical History:   Diagnosis Date     Chlamydia contact, treated 8/4/2017     Gonorrhea in female 9/24/2019     LTBI (latent tuberculosis infection) 12/3/2019     Patellofemoral disorder of left knee 12/1/2017     Patient Active Problem List    Diagnosis Date Noted     Chlamydia 08/05/2020     Priority: Medium     8/4/20 positive Chlamydia Tx ordered 8/5/20 retest for cure due 11/20  BTRN       Herpes simplex type 2 infection 08/04/2020     Priority: Medium        Past Surgical History:    No past surgical history on file.    Family History:    Family History   Problem Relation Age of Onset     Diabetes Maternal Grandmother      Diabetes Maternal Grandfather       Diabetes Paternal Grandmother      Diabetes Paternal Grandfather      Coronary Artery Disease No family hx of        Social History:  Lives with boyfriend    Physical Exam     Patient Vitals for the past 24 hrs:   BP Temp Pulse Resp SpO2 Weight   03/23/22 1028 -- -- -- -- 100 % 65.8 kg (145 lb)   03/23/22 1024 108/65 98  F (36.7  C) 87 16 -- --       Physical Exam  Eyes:  The pupils are equal and round    Conjunctivae and sclerae are normal  ENT:    The nose is normal    Pinnae are normal  CV:  Regular rate and rhythm     No edema  Resp:  Lungs are clear    Non-labored    No rales    No wheezing   GI:  Abdomen is soft with generalized tenderness, there is no rigidity    No distension    No rebound tenderness   MS:  Normal muscular tone    No asymmetric leg swelling  Skin:  No rash or acute skin lesions noted  Neuro:   Awake, alert.      Speech is normal and fluent.    Face is symmetric.     Moves all extremities      Emergency Department Course     Laboratory:  Labs Ordered and Resulted from Time of ED Arrival to Time of ED Departure   LIPASE - Abnormal       Result Value    Lipase 56 (*)    CBC WITH PLATELETS AND DIFFERENTIAL - Abnormal    WBC Count 4.2      RBC Count 4.53      Hemoglobin 12.8      Hematocrit 41.0      MCV 91      MCH 28.3      MCHC 31.2 (*)     RDW 13.2      Platelet Count 272      % Neutrophils 48      % Lymphocytes 40      % Monocytes 10      % Eosinophils 1      % Basophils 1      % Immature Granulocytes 0      NRBCs per 100 WBC 0      Absolute Neutrophils 2.0      Absolute Lymphocytes 1.7      Absolute Monocytes 0.4      Absolute Eosinophils 0.0      Absolute Basophils 0.0      Absolute Immature Granulocytes 0.0      Absolute NRBCs 0.0     COMPREHENSIVE METABOLIC PANEL - Normal    Sodium 139      Potassium 3.4      Chloride 108      Carbon Dioxide (CO2) 25      Anion Gap 6      Urea Nitrogen 7      Creatinine 0.80      Calcium 9.4      Glucose 90      Alkaline Phosphatase 54      AST 19       ALT 20      Protein Total 7.7      Albumin 4.1      Bilirubin Total 0.7      GFR Estimate >90     HCG QUALITATIVE PREGNANCY - Normal    hCG Serum Qualitative Negative           Emergency Department Course:    Interventions:  Medications   Enema Compound (docusate/mag cit/mineral oil/NaPhos) SIMPLE (286 mLs Rectal Given 3/23/22 2702)       Disposition:  The patient was discharged to home.    Impression & Plan      Medical Decision Making:  Kyle Steel is a 21 year old female who is here with abdominal pain, bloating and constipation.  She cannot remember her last normal bowel movement.  She did have a small bowel movement 3 days ago after using an enema at home.  She has felt increased bloating since then.  She said decreased oral intake.  On exam she has some generalized tenderness without focality.  She reports most of her pain is in the mid abdomen.  Laboratory work-up obtained and was negative.  She is given enema and reports that it did help with her pain and she did have some bowel movement.  She not having significant pain now but still feels that full sensation in her abdomen.  Given her history that is consistent with constipation and no other red flag symptoms on labs, recommended treatment with magnesium citrate at home and follow-up with PCP.  Discussed warning signs return precautions.  She was discharged home.      Diagnosis:    ICD-10-CM    1. Constipation, unspecified constipation type  K59.00    2. Abdominal pain, generalized  R10.84        Discharge Medications:  New Prescriptions    No medications on file         Scribe Disclosure:  Moe FAUSTIN MD, am serving as a scribe at 8:55 AM on 3/23/2022 to document services personally performed by Moe Krause MD based on my observations and the provider's statements to me.      Moe Krause MD  03/23/22 3415

## 2022-03-23 NOTE — DISCHARGE INSTRUCTIONS
Discharge Instructions  Abdominal Pain    Abdominal pain (belly pain) can be caused by many things. Your evaluation today does not show the exact cause for your pain. Your provider today has decided that it is unlikely your pain is due to a life threatening problem, or a problem requiring surgery or hospital admission. Sometimes those problems cannot be found right away, so it is very important that you follow up as directed.  Sometimes only the changes which occur over time allow the cause of your pain to be found.    Generally, every Emergency Department visit should have a follow-up clinic visit with either a primary or a specialty clinic/provider. Please follow-up as instructed by your emergency provider today. With abdominal pain, we often recommend very close follow-up, such as the following day.    ADULTS:  Return to the Emergency Department right away if:    You get an oral temperature above 102oF or as directed by your provider.  You have blood in your stools. This may be bright red or appear as black, tarry stools.    You keep vomiting (throwing up) or cannot drink liquids.  You see blood when you vomit.   You cannot have a bowel movement or you cannot pass gas.  Your stomach gets bloated or bigger.  Your skin or the whites of your eyes look yellow.  You faint.  You have bloody, frequent or painful urination (peeing).  You have new symptoms or anything that worries you.    CHILDREN:  Return to the Emergency Department right away if your child has any of the above-listed symptoms or the following:    Pushes your hand away or screams/cries when his/her belly is touched.  You notice your child is very fussy or weak.  Your child is very tired and is too tired to eat or drink.  Your child is dehydrated.  Signs of dehydration can be:  Significant change in the amount of wet diapers/urine.  Your infant or child starts to have dry mouth and lips, or no saliva (spit) or tears.    PREGNANT WOMEN:  Return to the  Emergency Department right away if you have any of the above-listed symptoms or the following:    You have bleeding, leaking fluid or passing tissue from the vagina.  You have worse pain or cramping, or pain in your shoulder or back.  You have vomiting that will not stop.  You have a temperature of 100oF or more.  Your baby is not moving as much as usual.  You faint.  You get a bad headache with or without eye problems and abdominal pain.  You have a seizure.  You have unusual discharge from your vagina and abdominal pain.    Abdominal pain is pretty common during pregnancy.  Your pain may or may not be related to your pregnancy. You should follow-up closely with your OB provider so they can evaluate you and your baby.  Until you follow-up with your regular provider, do the following:     Avoid sex and do not put anything in your vagina.  Drink clear fluids.  Only take medications approved by your provider.    MORE INFORMATION:    Appendicitis:  A possible cause of abdominal pain in any person who still has their appendix is acute appendicitis. Appendicitis is often hard to diagnose.  Testing does not always rule out early appendicitis or other causes of abdominal pain. Close follow-up with your provider and re-evaluations may be needed to figure out the reason for your abdominal pain.    Follow-up:  It is very important that you make an appointment with your clinic and go to the appointment.  If you do not follow-up with your primary provider, it may result in missing an important development which could result in permanent injury or disability and/or lasting pain.  If there is any problem keeping your appointment, call your provider or return to the Emergency Department.    Medications:  Take your medications as directed by your provider today.  Before using over-the-counter medications, ask your provider and make sure to take the medications as directed.  If you have any questions about medications, ask your  "provider.    Diet:  Resume your normal diet as much as possible, but do not eat fried, fatty or spicy foods while you have pain.  Do not drink alcohol or have caffeine.  Do not smoke tobacco.    Probiotics: If you have been given an antibiotic, you may want to also take a probiotic pill or eat yogurt with live cultures. Probiotics have \"good bacteria\" to help your intestines stay healthy. Studies have shown that probiotics help prevent diarrhea (loose stools) and other intestine problems (including C. diff infection) when you take antibiotics. You can buy these without a prescription in the pharmacy section of the store.     If you were given a prescription for medicine here today, be sure to read all of the information (including the package insert) that comes with your prescription.  This will include important information about the medicine, its side effects, and any warnings that you need to know about.  The pharmacist who fills the prescription can provide more information and answer questions you may have about the medicine.  If you have questions or concerns that the pharmacist cannot address, please call or return to the Emergency Department.       Remember that you can always come back to the Emergency Department if you are not able to see your regular provider in the amount of time listed above, if you get any new symptoms, or if there is anything that worries you.      Discharge Instructions  Constipation  Constipation can cause severe cramping pain and your provider thinks this might be the cause of your abdominal pain (belly pain) today.  People usually recognize that they are constipated because they have difficulty having bowel movements, are not having bowel movements frequently enough, or are not having large enough bowel movements. Sometimes, especially in children or older people, you do not recognize that you are constipated until it becomes severe. The most common causes of constipation are a " lack of exercise and not eating enough fruits, vegetables, and whole grains. Constipation can also be a side effect of medications, such as narcotics, or may be caused by a disease of the digestive system.    Generally, every Emergency Department visit should have a follow-up clinic visit with either a primary or a specialty clinic/provider. Please follow-up as instructed by your emergency provider today. Sometimes, chronic constipation requires further testing to determine the cause. If you are over 50 years old, you may need a colonoscopy if you have not had one before.     Return to the Emergency Department if:  Your abdominal pain worsens or does not improve after a bowel movement.  You become very weak.  You get a temperature above 102oF or as directed by your provider.  You have blood in your stools (bright red or black, tarry stools).  You keep vomiting (throwing up) or cannot drink liquids.  Your see blood when you vomit.  Your stomach gets bloated or bigger.  You have new symptoms or anything that worries you.    What can I do to help myself?  If your provider gave you a cathartic medication, like magnesium citrate or GoLytely  (polyethylene glycol), you can expect to have cramps and gas pains after taking it. You can expect to have a number of bowel movements and even diarrhea (loose or watery stools) in the course of clearing your bowels.  You will know your bowels have been cleaned out after you pass clear liquid. The cramps and gas should let up after you have emptied your bowels. You may want to wait until morning to take this type of medication so you aren t up in the night.   Sometimes instead of cathartics, we recommend laxatives like milk of magnesia to move your bowels more slowly, or an enema to help the bowels to move. Read and follow the package directions, or follow your provider s instructions.  Once you have become very constipated, it takes time for your bowels to return to normal and you  need to be very careful to prevent becoming constipated again. Take a laxative if you do not move your bowels at least every two days.     Eat foods that have a lot of fiber. Good choices are fruits, vegetables, prune juice, apple juice, and high fiber cereal. Limit dairy products such as milk and cheese, since these can make constipation worse.   Drink plenty of water.   When you feel the need to go to the bathroom, go to the bathroom. Do not hold it.  Miralax , Metamucil , Colace , Senna or fiber supplements can be used daily.  Miralax  daily is often the best choice for children.  If you were given a prescription for medicine here today, be sure to read all of the information (including the package insert) that comes with your prescription.  This will include important information about the medicine, its side effects, and any warnings that you need to know about.  The pharmacist who fills the prescription can provide more information and answer questions you may have about the medicine.  If you have questions or concerns that the pharmacist cannot address, please call or return to the Emergency Department.   Remember that you can always come back to the Emergency Department if you are not able to see your regular provider in the amount of time listed above, if you get any new symptoms, or if there is anything that worries you.

## 2022-03-23 NOTE — ED NOTES
Bed: ED04  Expected date:   Expected time:   Means of arrival:   Comments:  [facility] [place] - 446 - 21 F constipation eta [id number]

## 2022-05-10 ENCOUNTER — OFFICE VISIT (OUTPATIENT)
Dept: URGENT CARE | Facility: URGENT CARE | Age: 22
End: 2022-05-10
Payer: COMMERCIAL

## 2022-05-10 VITALS
DIASTOLIC BLOOD PRESSURE: 68 MMHG | TEMPERATURE: 97.8 F | HEART RATE: 91 BPM | WEIGHT: 150 LBS | RESPIRATION RATE: 18 BRPM | BODY MASS INDEX: 23.08 KG/M2 | SYSTOLIC BLOOD PRESSURE: 110 MMHG

## 2022-05-10 DIAGNOSIS — N76.0 BV (BACTERIAL VAGINOSIS): ICD-10-CM

## 2022-05-10 DIAGNOSIS — B96.89 BV (BACTERIAL VAGINOSIS): ICD-10-CM

## 2022-05-10 DIAGNOSIS — N89.8 VAGINAL DISCHARGE: ICD-10-CM

## 2022-05-10 DIAGNOSIS — Z20.2 POSSIBLE EXPOSURE TO STD: ICD-10-CM

## 2022-05-10 DIAGNOSIS — Z11.3 SCREEN FOR STD (SEXUALLY TRANSMITTED DISEASE): Primary | ICD-10-CM

## 2022-05-10 LAB
CLUE CELLS: PRESENT
TRICHOMONAS, WET PREP: ABNORMAL
WBC'S/HIGH POWER FIELD, WET PREP: ABNORMAL
YEAST, WET PREP: ABNORMAL

## 2022-05-10 PROCEDURE — 87591 N.GONORRHOEAE DNA AMP PROB: CPT | Performed by: PHYSICIAN ASSISTANT

## 2022-05-10 PROCEDURE — 99214 OFFICE O/P EST MOD 30 MIN: CPT | Mod: 25 | Performed by: PHYSICIAN ASSISTANT

## 2022-05-10 PROCEDURE — 87491 CHLMYD TRACH DNA AMP PROBE: CPT | Performed by: PHYSICIAN ASSISTANT

## 2022-05-10 PROCEDURE — 96372 THER/PROPH/DIAG INJ SC/IM: CPT | Performed by: PHYSICIAN ASSISTANT

## 2022-05-10 PROCEDURE — 87210 SMEAR WET MOUNT SALINE/INK: CPT | Performed by: PHYSICIAN ASSISTANT

## 2022-05-10 RX ORDER — METRONIDAZOLE 500 MG/1
500 TABLET ORAL 2 TIMES DAILY
Qty: 14 TABLET | Refills: 0 | Status: SHIPPED | OUTPATIENT
Start: 2022-05-10 | End: 2022-05-10

## 2022-05-10 RX ORDER — DOXYCYCLINE 100 MG/1
100 CAPSULE ORAL 2 TIMES DAILY
Qty: 20 CAPSULE | Refills: 0 | Status: SHIPPED | OUTPATIENT
Start: 2022-05-10 | End: 2022-11-04

## 2022-05-10 RX ORDER — METRONIDAZOLE 7.5 MG/G
1 GEL VAGINAL DAILY
Qty: 35 G | Refills: 0 | Status: SHIPPED | OUTPATIENT
Start: 2022-05-10 | End: 2022-05-17

## 2022-05-10 NOTE — PROGRESS NOTES
Assessment & Plan     Screen for STD (sexually transmitted disease)  STD pending  Wet prep positive for BV    - Wet prep - Clinic Collect  - Chlamydia trachomatis PCR [DCC311]  - Neisseria gonorrhoeae PCR [[id number]]  - INJECTION INTRAMUSCULAR OR SUB-Q    BV (bacterial vaginosis)  You have a vaginal infection called bacterial vaginosis (BV). Both good and bad bacteria are present in a healthy vagina. BV occurs when these bacteria get out of balance. The number of bad bacteria increase. And the number of good bacteria decrease. BV is linked with sexual activity, but it's not a sexually transmitted infection (STI).   BV may or may not cause symptoms. If symptoms do occur, they can include:     Thin, gray, milky-white, or sometimes green discharge    Unpleasant odor or  fishy  smell    Itching, burning, or pain in or around the vagina      - metroNIDAZOLE (METROGEL) 0.75 % vaginal gel; Place 1 applicator (5 g) vaginally daily for 7 days  - INJECTION INTRAMUSCULAR OR SUB-Q    Vaginal discharge  Treatment for gonorrhea and chlamydia today  - INJECTION INTRAMUSCULAR OR SUB-Q    Possible exposure to STD  Treated with rocephin and doxy today to cover for chlamydia and gonorrhea    - cefTRIAXone (ROCEPHIN) injection 500 mg  - doxycycline monohydrate (MONODOX) 100 MG capsule; Take 1 capsule (100 mg) by mouth 2 times daily  - INJECTION INTRAMUSCULAR OR SUB-Q        At today's visit with Kyle Steel , we discussed results, diagnosis, medications and formulated a plan.  We also discussed red flags for immediate return to clinic/ER, as well as indications for follow up if no improvement. Patient understood and agreed to plan. Kyle Steel was discharged with stable vitals and has no further questions.     No follow-ups on file.    Jose L Corley, [facility], PA-C  [facility] URGENT [facility]    Boston Wright is a 21 year old who presents for the following health issues     HPI     Kyle Steel, 21 year old,  female presents to the urgent care today with:   Urgent Care (Possible yeast )  possible exposure to STD and wants tested and treated    Review of Systems   Constitutional, HEENT, cardiovascular, pulmonary, gi and gu systems are negative, except as otherwise noted.      Objective    /68   Pulse 91   Temp 97.8  F (36.6  C)   Resp 18   Wt 68 kg (150 lb)   BMI 23.08 kg/m    Body mass index is 23.08 kg/m .  Physical Exam   GENERAL: healthy, alert and no distress  ABDOMEN: soft, nontender, no hepatosplenomegaly, no masses and bowel sounds normal   (male): deferred  MS: no gross musculoskeletal defects noted, no edema  SKIN: no suspicious lesions or rashes  NEURO: Normal strength and tone, mentation intact and speech normal  PSYCH: mentation appears normal, affect normal/bright

## 2022-05-10 NOTE — PROGRESS NOTES
Clinic Administered Medication Documentation    Administrations This Visit     cefTRIAXone (ROCEPHIN) injection 500 mg     Admin Date  05/10/2022 Action  Given Dose  500 mg Route  Intramuscular Site  Left Ventrogluteal Administered By  Stanley Phillip CMA    Ordering Provider: Jose L Corley PA-C    Patient Supplied?: No

## 2022-05-11 LAB
C TRACH DNA SPEC QL NAA+PROBE: POSITIVE
N GONORRHOEA DNA SPEC QL NAA+PROBE: POSITIVE

## 2022-05-15 ENCOUNTER — HEALTH MAINTENANCE LETTER (OUTPATIENT)
Age: 22
End: 2022-05-15

## 2022-06-19 ENCOUNTER — APPOINTMENT (OUTPATIENT)
Dept: GENERAL RADIOLOGY | Facility: CLINIC | Age: 22
End: 2022-06-19
Attending: EMERGENCY MEDICINE
Payer: COMMERCIAL

## 2022-06-19 ENCOUNTER — HOSPITAL ENCOUNTER (EMERGENCY)
Facility: CLINIC | Age: 22
Discharge: HOME | End: 2022-06-20
Attending: EMERGENCY MEDICINE | Admitting: EMERGENCY MEDICINE
Payer: COMMERCIAL

## 2022-06-19 DIAGNOSIS — S67.10XA CRUSH INJURY TO FINGER, INITIAL ENCOUNTER: ICD-10-CM

## 2022-06-19 PROCEDURE — 73140 X-RAY EXAM OF FINGER(S): CPT | Mod: RT

## 2022-06-19 PROCEDURE — 29130 APPL FINGER SPLINT STATIC: CPT | Mod: F7

## 2022-06-19 PROCEDURE — 99284 EMERGENCY DEPT VISIT MOD MDM: CPT

## 2022-06-19 PROCEDURE — 250N000013 HC RX MED GY IP 250 OP 250 PS 637: Performed by: EMERGENCY MEDICINE

## 2022-06-19 RX ORDER — IBUPROFEN 600 MG/1
600 TABLET, FILM COATED ORAL ONCE
Status: COMPLETED | OUTPATIENT
Start: 2022-06-19 | End: 2022-06-19

## 2022-06-19 RX ORDER — ACETAMINOPHEN 325 MG/1
975 TABLET ORAL ONCE
Status: COMPLETED | OUTPATIENT
Start: 2022-06-19 | End: 2022-06-19

## 2022-06-19 RX ADMIN — IBUPROFEN 600 MG: 600 TABLET ORAL at 22:46

## 2022-06-19 RX ADMIN — ACETAMINOPHEN 975 MG: 325 TABLET ORAL at 22:46

## 2022-06-20 VITALS
SYSTOLIC BLOOD PRESSURE: 109 MMHG | HEIGHT: 68 IN | BODY MASS INDEX: 23.49 KG/M2 | TEMPERATURE: 97 F | DIASTOLIC BLOOD PRESSURE: 65 MMHG | HEART RATE: 84 BPM | RESPIRATION RATE: 14 BRPM | OXYGEN SATURATION: 96 % | WEIGHT: 155 LBS

## 2022-06-20 ASSESSMENT — ENCOUNTER SYMPTOMS
WOUND: 1
NUMBNESS: 1

## 2022-06-20 NOTE — ED PROVIDER NOTES
"  History     Chief Complaint:  Hand Injury       HPI   Kyle Steel is a 21 year old female who presents with pain to her right middle finger after slamming it in a car door.  She is complaining of mild to moderate pain, her tetanus is up-to-date.  Pain is aggravated by palpation and movement.  There is an abrasion however no subungual hematoma, the nail is intact.    ROS:  Review of Systems   Skin: Positive for wound.   Neurological: Positive for numbness.   All other systems reviewed and are negative.      Allergies:  Nka [No Known Allergies]     Medications:    calcium polycarbophil (FIBERCON) 625 MG tablet  doxycycline monohydrate (MONODOX) 100 MG capsule  polyethylene glycol (MIRALAX) 17 GM/SCOOP powder  CONNER 3-0.03 MG tablet  valACYclovir (VALTREX) 500 MG tablet        Past Medical History:    Past Medical History:   Diagnosis Date     Chlamydia contact, treated 8/4/2017     Gonorrhea in female 9/24/2019     LTBI (latent tuberculosis infection) 12/3/2019     Patellofemoral disorder of left knee 12/1/2017       Past Surgical History:    No past surgical history on file.     Family History:    family history includes Diabetes in her maternal grandfather, maternal grandmother, paternal grandfather, and paternal grandmother.    Social History:   reports that she is a non-smoker but has been exposed to tobacco smoke. She has never used smokeless tobacco. She reports that she does not drink alcohol and does not use drugs.  [facility]     Physical Exam     Patient Vitals for the past 24 hrs:   BP Temp Temp src Pulse Resp SpO2 Height Weight   06/19/22 2241 109/65 97  F (36.1  C) Temporal 84 14 96 % 1.727 m (5' 8\") 70.3 kg (155 lb)        Physical Exam  General: Alert, interactive in mild distress  Head:  Scalp is atraumatic  Eyes:  The pupils are equal, round, and reactive to light    EOM's intact    No scleral icterus  ENT:      Nose:  The external nose is normal  Ears:  External ears are " normal     Neck:  Normal range of motion.      There is no rigidity.    Trachea is in the midline         CV:  Regular rate and rhythm    Brisk capillary refill distal to the injury  Resp:  Breath sounds are clear bilaterally    Non-labored, no retractions or accessory muscle use     MS:  Normal strength in all 4 extremities, tenderness to the distal phalanx of the right middle finger, limited range of motion of this finger secondary to pain  skin:  Warm and dry, abrasion on the dorsum of the right middle finger just proximal to the nail, no signs of a subungual hematoma.  Neuro: Sensation intact in the right middle finger  Psych:  Awake. Alert.  Normal affect.      Appropriate interactions.    Emergency Department Course   Imaging:  XR Finger Right G/E 2 Views   Final Result   IMPRESSION: Normal joint spaces and alignment. No fracture.            Report per radiology    Emergency Department Course:    Reviewed:  I reviewed nursing notes and vitals    Assessments:   I obtained history and examined the patient as noted above.    I rechecked the patient and explained findings.     Interventions:  Medications   acetaminophen (TYLENOL) tablet 975 mg (975 mg Oral Given 6/19/22 2246)   ibuprofen (ADVIL/MOTRIN) tablet 600 mg (600 mg Oral Given 6/19/22 2246)        Disposition:  The patient was discharged to home.     Impression & Plan    Medical Decision Making:  Following presentation history and physical examination were performed, findings are consistent with crush injury to the finger, there is no signs of neurovascular compromise.  There is a superficial abrasion but no lacerations requiring repair.  Wound was cleansed here and finger was placed in an AlumaFoam splint, she will be discharged home and follow-up with her primary care provider and return if new symptoms develop.  She was advised that she may lose the fingernail given the injury.    Diagnosis:    ICD-10-CM    1. Crush injury to finger, initial encounter   S67.10XA         6/20/2022   Trigger, Armando Thompson,*      Trigger, Armando Thompson MD  06/20/22 0017

## 2022-09-10 ENCOUNTER — HEALTH MAINTENANCE LETTER (OUTPATIENT)
Age: 22
End: 2022-09-10

## 2022-09-11 ENCOUNTER — APPOINTMENT (OUTPATIENT)
Dept: ULTRASOUND IMAGING | Facility: CLINIC | Age: 22
End: 2022-09-11
Attending: EMERGENCY MEDICINE
Payer: COMMERCIAL

## 2022-09-11 ENCOUNTER — HOSPITAL ENCOUNTER (EMERGENCY)
Facility: CLINIC | Age: 22
Discharge: HOME | End: 2022-09-11
Attending: EMERGENCY MEDICINE | Admitting: EMERGENCY MEDICINE
Payer: COMMERCIAL

## 2022-09-11 VITALS
TEMPERATURE: 97.6 F | HEIGHT: 67 IN | DIASTOLIC BLOOD PRESSURE: 79 MMHG | OXYGEN SATURATION: 99 % | BODY MASS INDEX: 24.01 KG/M2 | HEART RATE: 69 BPM | WEIGHT: 153 LBS | RESPIRATION RATE: 18 BRPM | SYSTOLIC BLOOD PRESSURE: 117 MMHG

## 2022-09-11 DIAGNOSIS — R10.9 ABDOMINAL CRAMPING: ICD-10-CM

## 2022-09-11 DIAGNOSIS — A59.9 TRICHOMONAS INFECTION: ICD-10-CM

## 2022-09-11 DIAGNOSIS — N93.9 VAGINAL BLEEDING: ICD-10-CM

## 2022-09-11 LAB
ALBUMIN SERPL-MCNC: 3.7 G/DL (ref 3.4–5)
ALP SERPL-CCNC: 46 U/L (ref 40–150)
ALT SERPL W P-5'-P-CCNC: 12 U/L (ref 0–50)
ANION GAP SERPL CALCULATED.3IONS-SCNC: 7 MMOL/L (ref 3–14)
AST SERPL W P-5'-P-CCNC: 12 U/L (ref 0–45)
BASOPHILS # BLD AUTO: 0 10E3/UL (ref 0–0.2)
BASOPHILS NFR BLD AUTO: 0 %
BILIRUB SERPL-MCNC: 0.6 MG/DL (ref 0.2–1.3)
BUN SERPL-MCNC: 15 MG/DL (ref 7–30)
CALCIUM SERPL-MCNC: 8.8 MG/DL (ref 8.5–10.1)
CHLORIDE BLD-SCNC: 106 MMOL/L (ref 94–109)
CLUE CELLS: ABNORMAL
CO2 SERPL-SCNC: 26 MMOL/L (ref 20–32)
CREAT SERPL-MCNC: 1.1 MG/DL (ref 0.52–1.04)
EOSINOPHIL # BLD AUTO: 0.1 10E3/UL (ref 0–0.7)
EOSINOPHIL NFR BLD AUTO: 1 %
ERYTHROCYTE [DISTWIDTH] IN BLOOD BY AUTOMATED COUNT: 12.9 % (ref 10–15)
GFR SERPL CREATININE-BSD FRML MDRD: 73 ML/MIN/1.73M2
GLUCOSE BLD-MCNC: 90 MG/DL (ref 70–99)
HCG SERPL QL: NEGATIVE
HCT VFR BLD AUTO: 35.2 % (ref 35–47)
HGB BLD-MCNC: 11.5 G/DL (ref 11.7–15.7)
IMM GRANULOCYTES # BLD: 0 10E3/UL
IMM GRANULOCYTES NFR BLD: 0 %
LYMPHOCYTES # BLD AUTO: 1.9 10E3/UL (ref 0.8–5.3)
LYMPHOCYTES NFR BLD AUTO: 37 %
MCH RBC QN AUTO: 29 PG (ref 26.5–33)
MCHC RBC AUTO-ENTMCNC: 32.7 G/DL (ref 31.5–36.5)
MCV RBC AUTO: 89 FL (ref 78–100)
MONOCYTES # BLD AUTO: 0.5 10E3/UL (ref 0–1.3)
MONOCYTES NFR BLD AUTO: 11 %
NEUTROPHILS # BLD AUTO: 2.5 10E3/UL (ref 1.6–8.3)
NEUTROPHILS NFR BLD AUTO: 51 %
NRBC # BLD AUTO: 0 10E3/UL
NRBC BLD AUTO-RTO: 0 /100
PLATELET # BLD AUTO: 306 10E3/UL (ref 150–450)
POTASSIUM BLD-SCNC: 3.7 MMOL/L (ref 3.4–5.3)
PROT SERPL-MCNC: 7.1 G/DL (ref 6.8–8.8)
RBC # BLD AUTO: 3.96 10E6/UL (ref 3.8–5.2)
SODIUM SERPL-SCNC: 139 MMOL/L (ref 133–144)
TRICHOMONAS, WET PREP: PRESENT
WBC # BLD AUTO: 5.1 10E3/UL (ref 4–11)
WBC'S/HIGH POWER FIELD, WET PREP: ABNORMAL
YEAST, WET PREP: ABNORMAL

## 2022-09-11 PROCEDURE — 87210 SMEAR WET MOUNT SALINE/INK: CPT | Performed by: EMERGENCY MEDICINE

## 2022-09-11 PROCEDURE — 250N000011 HC RX IP 250 OP 636: Performed by: EMERGENCY MEDICINE

## 2022-09-11 PROCEDURE — 87591 N.GONORRHOEAE DNA AMP PROB: CPT | Performed by: EMERGENCY MEDICINE

## 2022-09-11 PROCEDURE — 84703 CHORIONIC GONADOTROPIN ASSAY: CPT | Performed by: EMERGENCY MEDICINE

## 2022-09-11 PROCEDURE — 96374 THER/PROPH/DIAG INJ IV PUSH: CPT

## 2022-09-11 PROCEDURE — 87491 CHLMYD TRACH DNA AMP PROBE: CPT | Performed by: EMERGENCY MEDICINE

## 2022-09-11 PROCEDURE — 80053 COMPREHEN METABOLIC PANEL: CPT | Performed by: EMERGENCY MEDICINE

## 2022-09-11 PROCEDURE — 250N000013 HC RX MED GY IP 250 OP 250 PS 637: Performed by: EMERGENCY MEDICINE

## 2022-09-11 PROCEDURE — 76856 US EXAM PELVIC COMPLETE: CPT

## 2022-09-11 PROCEDURE — 250N000009 HC RX 250: Performed by: EMERGENCY MEDICINE

## 2022-09-11 PROCEDURE — 96372 THER/PROPH/DIAG INJ SC/IM: CPT | Mod: XS | Performed by: EMERGENCY MEDICINE

## 2022-09-11 PROCEDURE — 85025 COMPLETE CBC W/AUTO DIFF WBC: CPT | Performed by: EMERGENCY MEDICINE

## 2022-09-11 PROCEDURE — 36415 COLL VENOUS BLD VENIPUNCTURE: CPT | Performed by: EMERGENCY MEDICINE

## 2022-09-11 PROCEDURE — 99285 EMERGENCY DEPT VISIT HI MDM: CPT | Mod: 25

## 2022-09-11 RX ORDER — DOXYCYCLINE 100 MG/1
100 CAPSULE ORAL EVERY 12 HOURS SCHEDULED
Status: DISCONTINUED | OUTPATIENT
Start: 2022-09-11 | End: 2022-09-12 | Stop reason: HOSPADM

## 2022-09-11 RX ORDER — METRONIDAZOLE 500 MG/1
500 TABLET ORAL ONCE
Status: COMPLETED | OUTPATIENT
Start: 2022-09-11 | End: 2022-09-11

## 2022-09-11 RX ORDER — KETOROLAC TROMETHAMINE 15 MG/ML
15 INJECTION, SOLUTION INTRAMUSCULAR; INTRAVENOUS ONCE
Status: COMPLETED | OUTPATIENT
Start: 2022-09-11 | End: 2022-09-11

## 2022-09-11 RX ORDER — DOXYCYCLINE 100 MG/1
100 CAPSULE ORAL 2 TIMES DAILY
Qty: 14 CAPSULE | Refills: 0 | Status: SHIPPED | OUTPATIENT
Start: 2022-09-12 | End: 2022-09-19

## 2022-09-11 RX ORDER — METRONIDAZOLE 500 MG/1
500 TABLET ORAL 2 TIMES DAILY
Qty: 14 TABLET | Refills: 0 | Status: SHIPPED | OUTPATIENT
Start: 2022-09-12 | End: 2022-09-19

## 2022-09-11 RX ADMIN — METRONIDAZOLE 500 MG: 500 TABLET ORAL at 22:10

## 2022-09-11 RX ADMIN — KETOROLAC TROMETHAMINE 15 MG: 15 INJECTION, SOLUTION INTRAMUSCULAR; INTRAVENOUS at 18:43

## 2022-09-11 RX ADMIN — LIDOCAINE HYDROCHLORIDE 500 MG: 10 INJECTION, SOLUTION EPIDURAL; INFILTRATION; INTRACAUDAL; PERINEURAL at 22:44

## 2022-09-11 RX ADMIN — DOXYCYCLINE HYCLATE 100 MG: 100 CAPSULE ORAL at 22:10

## 2022-09-11 ASSESSMENT — ACTIVITIES OF DAILY LIVING (ADL)
ADLS_ACUITY_SCORE: 35
ADLS_ACUITY_SCORE: 35

## 2022-09-11 ASSESSMENT — ENCOUNTER SYMPTOMS
WEAKNESS: 1
HEADACHES: 0
FEVER: 0
LIGHT-HEADEDNESS: 1

## 2022-09-11 NOTE — ED PROVIDER NOTES
"  History   Chief Complaint:  Vaginal Bleeding       HPI   Kyle Steel is a 22 year old female who presents with vaginal bleeding. The patient reports that she has been having vaginal bleeding and cramps since her surgery on ovarian cyst on august 22 2022. The surgery was performed by Dr. Priest at [facility]. She also repots having IUD placement during the surgery on august 22. She states that today she experienced severe intermittent cramp and episode of vaginal bleeding/passage of tissue. She uses tissue for the vaginal bleeding and notes that bleeding has significantly improved. She denies having headache, vision change, chest pain, fever, but she does indicate having light headedness,weakness, abdominal pain.  Reports sexual activity with 1 partner.      Review of Systems   Constitutional: Negative for fever.   Eyes: Negative for visual disturbance.   Cardiovascular: Negative for chest pain.   Gastrointestinal: Positive for abdominal pain (Cramping).   Genitourinary: Positive for vaginal bleeding.   Neurological: Positive for weakness and light-headedness. Negative for headaches.   All other systems reviewed and are negative.    Allergies:  The patient has no known allergies.     Medications:  Fibercon  Monodox  Miralax  Valtrex  Zofran  Naprosyn  Roxicodone  Deltasone  Norco    Past Medical History:     Herpes simplex type 2 infection   Chlamydia    Past Surgical History:    IUD placement     Family History:    Diabetes - grandmother  Diabetes - grandfather    Social History:  The patient presents to the ED alone. The patient reports to be non smoker.  PCP: Arab[facility]     Physical Exam     Patient Vitals for the past 24 hrs:   BP Temp Temp src Pulse Resp SpO2 Height Weight   09/11/22 2122 117/79 -- -- 69 -- -- -- --   09/11/22 1751 116/53 97.6  F (36.4  C) Temporal 85 18 99 % 1.702 m (5' 7\") 69.4 kg (153 lb)   09/11/22 1749 116/53 97.6  F (36.4  C) Temporal 83 20 100 % 1.702 m (5' 7\") 69.4 " kg (153 lb)       Physical Exam  General: Alert and cooperative with exam. Patient in mild distress. Normal mentation.  Head:  Scalp is NC/AT  Eyes:  No scleral icterus, PERRL  ENT:  The external nose and ears are normal.   Neck:  Normal range of motion without rigidity.  CV:  Regular rate and rhythm    No pathologic murmur   Resp:  Breath sounds are clear bilaterally    Non-labored, no retractions or accessory muscle use  GI:  Abdomen is soft, no distension, no tenderness. No peritoneal signs, mild LLQ TTP  MS:  No lower extremity edema   Skin:  Warm and dry, No rash or lesions noted.  Neuro: Oriented x 3. No gross motor deficits.  :  Chaperoned pelvic exam demonstrates normal external genital exam.  Cervical os visualized with IUD strings present.  Scant blood-tinged mucus in the vaginal canal without evidence of significant ongoing bleeding.      Emergency Department Course     Imaging:  US Pelvic Complete with Transvaginal   Final Result   IMPRESSION:   1.  Normal pelvic ultrasound.   2.  IUD within the endometrial canal. Endometrial thickness is 4 mm.                 Report per radiology    Laboratory:  Labs Ordered and Resulted from Time of ED Arrival to Time of ED Departure   COMPREHENSIVE METABOLIC PANEL - Abnormal       Result Value    Sodium 139      Potassium 3.7      Chloride 106      Carbon Dioxide (CO2) 26      Anion Gap 7      Urea Nitrogen 15      Creatinine 1.10 (*)     Calcium 8.8      Glucose 90      Alkaline Phosphatase 46      AST 12      ALT 12      Protein Total 7.1      Albumin 3.7      Bilirubin Total 0.6      GFR Estimate 73     CBC WITH PLATELETS AND DIFFERENTIAL - Abnormal    WBC Count 5.1      RBC Count 3.96      Hemoglobin 11.5 (*)     Hematocrit 35.2      MCV 89      MCH 29.0      MCHC 32.7      RDW 12.9      Platelet Count 306      % Neutrophils 51      % Lymphocytes 37      % Monocytes 11      % Eosinophils 1      % Basophils 0      % Immature Granulocytes 0      NRBCs per 100 WBC  0      Absolute Neutrophils 2.5      Absolute Lymphocytes 1.9      Absolute Monocytes 0.5      Absolute Eosinophils 0.1      Absolute Basophils 0.0      Absolute Immature Granulocytes 0.0      Absolute NRBCs 0.0     WET PREPARATION - Abnormal    Trichomonas Present (*)     Yeast Absent      Clue Cells Absent      WBCs/high power field 3+ (*)    HCG QUALITATIVE PREGNANCY - Normal    hCG Serum Qualitative Negative          Emergency Department Course:       Reviewed:  I reviewed nursing notes, vitals, past medical history and Care Everywhere    Assessments:  1811 I obtained history and examined the patient as noted above.    I rechecked the patient and explained findings.     Interventions:  1843 Toradol 15 mg IV  2210 Vibramycin 100 mg oral  2244 Rocephin 500 mg IM  2210 Flagyl 500 mg oral     Disposition:  The patient was discharged to home.     Impression & Plan     Medical Decision Making:  Kyle Steel is a 22 year old female who presents with mild pelvic pain and episode of vaginal bleeding/passage of tissue.   She is currently not pregnant as confirmed by laboratory testing.  They look overall well and have a reassuring exam.  A broad differential diagnosis was considered including colitis, appendicitis, intestinal cramping,  pyelonephritis, UTI, kidney stone, constipation, diverticulitis, endometriosis, obstruction, ovarian cyst (enlarged or ruptured), ovarian torsion,  PID, TOA, as most likely possibilities.    The workup in the ED is at this point negative.  No etiology for her pain is found at this point and my suspicion of an intraabdominal catastrophe or other worrisome etiology is very low.  Blood work is reassuring.  Transvaginal ultrasound without significant findings.  Pelvic exam demonstrates IUD in proper position without any evidence of significant ongoing bleeding.  Patient denies any urinary symptoms.  Patient's wet prep did return positive for trichomonas; gonorrhea and Chlamydia testing  pending.  Discussed empiric STI treatment with patient who is in agreement and she was provided IM ceftriaxone and discharged on Flagyl and doxycycline.  Discussed need for abstinence until antibiotic therapy completed and discussing her diagnosis with sexual partner/s so they too may get treated.  No evidence of PID on exam.  She was provided Toradol for mild abdominal discomfort with noted improvement.  Recommended continued use Tylenol/ibuprofen for pain control.  Patient notes that she has follow-up scheduled with OB/GYN tomorrow; encouraged her to keep this appointment for reevaluation.  Patient hemodynamically stable, well-appearing, and without significant abdominal discomfort at time of discharge.  I feel she is safe and appropriate for continued outpatient management.      Diagnosis:    ICD-10-CM    1. Trichomonas infection  A59.9    2. Vaginal bleeding  N93.9    3. Abdominal cramping  R10.9        Discharge Medications:  Discharge Medication List as of 9/11/2022 10:40 PM      START taking these medications    Details   doxycycline hyclate (VIBRAMYCIN) 100 MG capsule Take 1 capsule (100 mg) by mouth 2 times daily for 7 days, Disp-14 capsule, R-0, E-Prescribe      metroNIDAZOLE (FLAGYL) 500 MG tablet Take 1 tablet (500 mg) by mouth 2 times daily for 7 days, Disp-14 tablet, R-0, E-Prescribe             Scribe Disclosure:  I, MARY DEL CID, am serving as a scribe at 6:11 PM on 9/11/2022 to document services personally performed by Robe Bateman DO, based on my observations and the provider's statements to me.              Robe Bateman DO  09/12/22 [id number]

## 2022-09-11 NOTE — LETTER
September 11, 2022      To Whom It May Concern:      Kyle Steel was seen in our Emergency Department today, 09/11/22.  I expect her condition to improve over the next 2 days.  She may return to work/school when improved.    Sincerely,        Robe Bateman, DO

## 2022-09-12 LAB
C TRACH DNA SPEC QL NAA+PROBE: NEGATIVE
N GONORRHOEA DNA SPEC QL NAA+PROBE: NEGATIVE

## 2022-09-12 ASSESSMENT — ENCOUNTER SYMPTOMS: ABDOMINAL PAIN: 1

## 2022-09-12 NOTE — DISCHARGE INSTRUCTIONS
Take doxycycline and Flagyl as prescribed    Recommend 600 mg ibuprofen every 6 hours as needed for pain and/or vaginal bleeding  Tylenol as needed for additional pain relief

## 2022-11-04 ENCOUNTER — OFFICE VISIT (OUTPATIENT)
Dept: FAMILY MEDICINE | Facility: CLINIC | Age: 22
End: 2022-11-04
Payer: COMMERCIAL

## 2022-11-04 VITALS
WEIGHT: 151.6 LBS | DIASTOLIC BLOOD PRESSURE: 65 MMHG | BODY MASS INDEX: 23.74 KG/M2 | HEART RATE: 65 BPM | SYSTOLIC BLOOD PRESSURE: 107 MMHG | RESPIRATION RATE: 14 BRPM | TEMPERATURE: 98.8 F | OXYGEN SATURATION: 99 %

## 2022-11-04 DIAGNOSIS — R11.2 NAUSEA AND VOMITING, UNSPECIFIED VOMITING TYPE: Primary | ICD-10-CM

## 2022-11-04 DIAGNOSIS — N80.9 ENDOMETRIOSIS: ICD-10-CM

## 2022-11-04 LAB
ALBUMIN UR-MCNC: NEGATIVE MG/DL
APPEARANCE UR: CLEAR
BACTERIA #/AREA URNS HPF: ABNORMAL /HPF
BASOPHILS # BLD AUTO: 0 10E3/UL (ref 0–0.2)
BASOPHILS NFR BLD AUTO: 1 %
BILIRUB UR QL STRIP: NEGATIVE
COLOR UR AUTO: YELLOW
DEPRECATED S PYO AG THROAT QL EIA: NEGATIVE
EOSINOPHIL # BLD AUTO: 0.1 10E3/UL (ref 0–0.7)
EOSINOPHIL NFR BLD AUTO: 1 %
ERYTHROCYTE [DISTWIDTH] IN BLOOD BY AUTOMATED COUNT: 12.9 % (ref 10–15)
FLUAV AG SPEC QL IA: NEGATIVE
FLUBV AG SPEC QL IA: NEGATIVE
GLUCOSE UR STRIP-MCNC: NEGATIVE MG/DL
HCT VFR BLD AUTO: 35 % (ref 35–47)
HGB BLD-MCNC: 11.4 G/DL (ref 11.7–15.7)
HGB UR QL STRIP: ABNORMAL
IMM GRANULOCYTES # BLD: 0 10E3/UL
IMM GRANULOCYTES NFR BLD: 0 %
KETONES UR STRIP-MCNC: NEGATIVE MG/DL
LEUKOCYTE ESTERASE UR QL STRIP: NEGATIVE
LYMPHOCYTES # BLD AUTO: 2.2 10E3/UL (ref 0.8–5.3)
LYMPHOCYTES NFR BLD AUTO: 35 %
MCH RBC QN AUTO: 29.2 PG (ref 26.5–33)
MCHC RBC AUTO-ENTMCNC: 32.6 G/DL (ref 31.5–36.5)
MCV RBC AUTO: 90 FL (ref 78–100)
MONOCYTES # BLD AUTO: 0.6 10E3/UL (ref 0–1.3)
MONOCYTES NFR BLD AUTO: 10 %
MUCOUS THREADS #/AREA URNS LPF: PRESENT /LPF
NEUTROPHILS # BLD AUTO: 3.3 10E3/UL (ref 1.6–8.3)
NEUTROPHILS NFR BLD AUTO: 53 %
NITRATE UR QL: NEGATIVE
PH UR STRIP: 7 [PH] (ref 5–8)
PLATELET # BLD AUTO: 274 10E3/UL (ref 150–450)
RBC # BLD AUTO: 3.9 10E6/UL (ref 3.8–5.2)
RBC #/AREA URNS AUTO: ABNORMAL /HPF
SP GR UR STRIP: 1.02 (ref 1–1.03)
SQUAMOUS #/AREA URNS AUTO: ABNORMAL /LPF
UROBILINOGEN UR STRIP-ACNC: 0.2 E.U./DL
WBC # BLD AUTO: 6.3 10E3/UL (ref 4–11)
WBC #/AREA URNS AUTO: ABNORMAL /HPF

## 2022-11-04 PROCEDURE — U0003 INFECTIOUS AGENT DETECTION BY NUCLEIC ACID (DNA OR RNA); SEVERE ACUTE RESPIRATORY SYNDROME CORONAVIRUS 2 (SARS-COV-2) (CORONAVIRUS DISEASE [COVID-19]), AMPLIFIED PROBE TECHNIQUE, MAKING USE OF HIGH THROUGHPUT TECHNOLOGIES AS DESCRIBED BY CMS-2020-01-R: HCPCS | Performed by: PHYSICIAN ASSISTANT

## 2022-11-04 PROCEDURE — U0005 INFEC AGEN DETEC AMPLI PROBE: HCPCS | Performed by: PHYSICIAN ASSISTANT

## 2022-11-04 PROCEDURE — 99214 OFFICE O/P EST MOD 30 MIN: CPT | Mod: CS | Performed by: PHYSICIAN ASSISTANT

## 2022-11-04 PROCEDURE — 36415 COLL VENOUS BLD VENIPUNCTURE: CPT | Performed by: PHYSICIAN ASSISTANT

## 2022-11-04 PROCEDURE — 87804 INFLUENZA ASSAY W/OPTIC: CPT | Performed by: PHYSICIAN ASSISTANT

## 2022-11-04 PROCEDURE — 81001 URINALYSIS AUTO W/SCOPE: CPT

## 2022-11-04 PROCEDURE — 85025 COMPLETE CBC W/AUTO DIFF WBC: CPT | Performed by: PHYSICIAN ASSISTANT

## 2022-11-04 PROCEDURE — 87651 STREP A DNA AMP PROBE: CPT | Performed by: PHYSICIAN ASSISTANT

## 2022-11-04 RX ORDER — ONDANSETRON 4 MG/1
4 TABLET, ORALLY DISINTEGRATING ORAL EVERY 8 HOURS PRN
Qty: 10 TABLET | Refills: 0 | Status: SHIPPED | OUTPATIENT
Start: 2022-11-04

## 2022-11-04 NOTE — LETTER
[facility]  [address] [address]  Phone: [phone]  Fax: [phone]    November 4, 2022        Kyle Steel  [id number]  [place] [address]          To whom it may concern:    RE: Kyle Steel    She is excused from work for 11/4/2022.      Please contact me for questions or concerns.      Sincerely,        Noni Montoya PA-C

## 2022-11-05 LAB
GROUP A STREP BY PCR: NOT DETECTED
SARS-COV-2 RNA RESP QL NAA+PROBE: NEGATIVE

## 2022-11-05 NOTE — PROGRESS NOTES
Assessment & Plan:      Problem List Items Addressed This Visit    None  Visit Diagnoses     Nausea and vomiting, unspecified vomiting type    -  Primary    Relevant Medications    ondansetron (ZOFRAN ODT) 4 MG ODT tab    Other Relevant Orders    UA macro with reflex to Microscopic and Culture - Clinc Collect (Completed)    Urine Microscopic (Completed)    CBC with platelets and differential (Completed)    Streptococcus A Rapid Screen w/Reflex to PCR - Clinic Collect (Completed)    Influenza A & B Antigen - Clinic Collect (Completed)    Symptomatic; Yes; 10/31/2022 COVID-19 Virus (Coronavirus) by PCR Nose    Group A Streptococcus PCR Throat Swab    Endometriosis            Medical Decision Making  Patient with history of endometriosis presents with nausea and vomiting due to unknown cause.  Urine analysis is negative for UTI and CBC shows normal white blood cell count to rule out signs of worse infection.  Rapid strep and influenza are negative at this time.  Recommend trial of Zofran to help with nausea as needed.  There is impresses COVID-19.  Recommend patient follow-up with OB/GYN for further recommendations and control of discomfort from history of endometriosis.  Allergies and medication interactions reviewed.  Discussed signs of worsening symptoms and when to follow-up with PCP if no symptom improvement.     Subjective:      Kyle Steel is a 22 year old female with history of endometriosis, here for evaluation of nausea, vomiting, and headaches.  Onset of symptoms was 4 to 5 days ago.  Patient initially developed nausea, headaches, and sensitivity to smells.  Starting this morning patient had 2 episodes of emesis.  Patient otherwise denies fevers, sore throat, cough, rhinorrhea.  She does note lower abdominal cramping, but she states she has this every day due to her history of endometriosis.  Patient currently using IUD for contraception.     The following portions of the patient's history were reviewed  and updated as appropriate: allergies, current medications, and problem list.     Review of Systems  Pertinent items are noted in HPI.    Allergies  Allergies   Allergen Reactions     Nka [No Known Allergies]        Family History   Problem Relation Age of Onset     Diabetes Maternal Grandmother      Diabetes Maternal Grandfather      Diabetes Paternal Grandmother      Diabetes Paternal Grandfather      Coronary Artery Disease No family hx of        Social History     Tobacco Use     Smoking status: Never     Passive exposure: Yes     Smokeless tobacco: Never   Substance Use Topics     Alcohol use: No        Objective:      /65   Pulse 65   Temp 98.8  F (37.1  C) (Oral)   Resp 14   Wt 68.8 kg (151 lb 9.6 oz)   SpO2 99%   BMI 23.74 kg/m    General appearance - alert, well appearing, and in no distress and non-toxic  Ears - bilateral TM's and external ear canals normal  Nose - normal and patent, no erythema, discharge or polyps  Mouth - mucous membranes moist, pharynx normal without lesions  Neck - supple, no significant adenopathy  Chest - clear to auscultation, no wheezes, rales or rhonchi, symmetric air entry  Heart - normal rate, regular rhythm, normal S1, S2, no murmurs, rubs, clicks or gallops  Abdomen - Tenderness throughout the lower abdomen, otherwise abdomen soft, no masses organomegaly, normal bowel sounds  Back exam - No CVA tenderness     Lab & Imaging Results    Results for orders placed or performed in visit on 11/04/22   UA macro with reflex to Microscopic and Culture - Clinc Collect     Status: Abnormal    Specimen: Urine, Clean Catch   Result Value Ref Range    Color Urine Yellow Colorless, Straw, Light Yellow, Yellow    Appearance Urine Clear Clear    Glucose Urine Negative Negative, 1000 , >=2000 mg/dL    Bilirubin Urine Negative Negative    Ketones Urine Negative Negative, 160  mg/dL    Specific Gravity Urine 1.025 1.005 - 1.030    Blood Urine Trace (A) Negative    pH Urine 7.0 5.0 -  8.0    Protein Albumin Urine Negative Negative, 300 , >=2000 mg/dL    Urobilinogen Urine 0.2 0.2, 1.0 E.U./dL    Nitrite Urine Negative Negative    Leukocyte Esterase Urine Negative Negative   Urine Microscopic     Status: Abnormal   Result Value Ref Range    Bacteria Urine Few (A) None Seen /HPF    RBC Urine 2-5 (A) 0-2 /HPF /HPF    WBC Urine None Seen 0-5 /HPF /HPF    Squamous Epithelials Urine Few (A) None Seen /LPF    Mucus Urine Present (A) None Seen /LPF    Narrative    Urine Culture not indicated   CBC with platelets and differential     Status: Abnormal   Result Value Ref Range    WBC Count 6.3 4.0 - 11.0 10e3/uL    RBC Count 3.90 3.80 - 5.20 10e6/uL    Hemoglobin 11.4 (L) 11.7 - 15.7 g/dL    Hematocrit 35.0 35.0 - 47.0 %    MCV 90 78 - 100 fL    MCH 29.2 26.5 - 33.0 pg    MCHC 32.6 31.5 - 36.5 g/dL    RDW 12.9 10.0 - 15.0 %    Platelet Count 274 150 - 450 10e3/uL    % Neutrophils 53 %    % Lymphocytes 35 %    % Monocytes 10 %    % Eosinophils 1 %    % Basophils 1 %    % Immature Granulocytes 0 %    Absolute Neutrophils 3.3 1.6 - 8.3 10e3/uL    Absolute Lymphocytes 2.2 0.8 - 5.3 10e3/uL    Absolute Monocytes 0.6 0.0 - 1.3 10e3/uL    Absolute Eosinophils 0.1 0.0 - 0.7 10e3/uL    Absolute Basophils 0.0 0.0 - 0.2 10e3/uL    Absolute Immature Granulocytes 0.0 <=0.4 10e3/uL   Streptococcus A Rapid Screen w/Reflex to PCR - Clinic Collect     Status: Normal    Specimen: Throat; Swab   Result Value Ref Range    Group A Strep antigen Negative Negative   Influenza A & B Antigen - Clinic Collect     Status: Normal    Specimen: Nose; Swab   Result Value Ref Range    Influenza A antigen Negative Negative    Influenza B antigen Negative Negative    Narrative    Test results must be correlated with clinical data. If necessary, results should be confirmed by a molecular assay or viral culture.   CBC with platelets and differential     Status: Abnormal    Narrative    The following orders were created for panel order CBC  with platelets and differential.  Procedure                               Abnormality         Status                     ---------                               -----------         ------                     CBC with platelets and d...[[id number]]  Abnormal            Final result                 Please view results for these tests on the individual orders.       I personally reviewed these results and discussed findings with the patient.    The use of Dragon[employer] services was used to construct the content of this note; any grammatical errors are non-intentional. Please contact the author directly if you are in need of any clarification.

## 2022-11-07 ENCOUNTER — VIRTUAL VISIT (OUTPATIENT)
Dept: FAMILY MEDICINE | Facility: CLINIC | Age: 22
End: 2022-11-07
Payer: COMMERCIAL

## 2022-11-07 DIAGNOSIS — Z76.89 RETURN TO WORK EVALUATION: Primary | ICD-10-CM

## 2022-11-07 DIAGNOSIS — A08.4 VIRAL GASTROENTERITIS: ICD-10-CM

## 2022-11-07 PROCEDURE — 99213 OFFICE O/P EST LOW 20 MIN: CPT | Mod: 95 | Performed by: STUDENT IN AN ORGANIZED HEALTH CARE EDUCATION/TRAINING PROGRAM

## 2022-11-07 ASSESSMENT — PATIENT HEALTH QUESTIONNAIRE - PHQ9
SUM OF ALL RESPONSES TO PHQ QUESTIONS 1-9: 3
SUM OF ALL RESPONSES TO PHQ QUESTIONS 1-9: 3
10. IF YOU CHECKED OFF ANY PROBLEMS, HOW DIFFICULT HAVE THESE PROBLEMS MADE IT FOR YOU TO DO YOUR WORK, TAKE CARE OF THINGS AT HOME, OR GET ALONG WITH OTHER PEOPLE: NOT DIFFICULT AT ALL

## 2022-11-07 NOTE — PROGRESS NOTES
Kyle is a 22 year old who is being evaluated via a billable video visit.      How would you like to obtain your AVS? MyChart  If the video visit is dropped, the invitation should be resent by: Text to cell phone: [phone]  Will anyone else be joining your video visit? 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    Lesli Granados DO  [facility]    Boston Wright is a 22 year old, presenting for the following health issues:  No chief complaint on file.    Patient was seen on 11/4 for headache, nausea and vomiting.  Him also had some abdominal pain that was thought to be 2/2 endometriosis.  Work-up included negative UA, unremarkable CBC, negative strep/flu/COVID.  Was sent home with some Zofran.  Was unable to go to work over the weekend.  Started to feel better on Sunday and has now been 24 hours without emesis.  Has no fevers, chills.  Not quite back to baseline but improving.  Her appetite is improving.  She does not report any aches/pains.  Was given a letter for her.  She is meeting with me today requests a letter to return to work on Tuesday.    Review of Systems   As per HPI       Objective       Vitals:  No vitals were obtained today due to virtual visit.    Physical Exam   GENERAL: Healthy, alert and no distress  EYES: Eyes grossly normal to inspection.  No discharge or erythema, or obvious scleral/conjunctival abnormalities.  RESP: No audible wheeze, cough, or visible cyanosis.  No visible retractions or increased work of breathing.    SKIN: Visible skin clear. No significant rash, abnormal pigmentation  or lesions.  NEURO: Cranial nerves grossly intact.  Mentation and speech appropriate for age.  PSYCH: Mentation appears normal, affect normal/bright, judgement and insight intact, normal speech and appearance well-groomed.          Video-Visit Details    Video Start Time: 7:56 AM    Type of service:  Video Visit    Video End Time:8:00 AM    Originating Location (pt. Location): Home        Distant Location (provider location):  On-site    Platform used for Video Visit: Bladimir

## 2022-11-08 ENCOUNTER — TELEPHONE (OUTPATIENT)
Dept: ADMINISTRATIVE | Facility: CLINIC | Age: 22
End: 2022-11-08

## 2022-11-08 NOTE — LETTER
November 9, 2022      Kyle Steel  [address]  [place] [facility] [address]        To Whom It May Concern:    Kyle Steel was seen in our clinic. She may return to work without restrictions startin 11/8/22      Sincerely,        Physician No Ref-Primary

## 2023-06-03 ENCOUNTER — HEALTH MAINTENANCE LETTER (OUTPATIENT)
Age: 23
End: 2023-06-03

## 2023-07-25 ENCOUNTER — VIRTUAL VISIT (OUTPATIENT)
Dept: FAMILY MEDICINE | Facility: CLINIC | Age: 23
End: 2023-07-25
Payer: COMMERCIAL

## 2023-07-25 DIAGNOSIS — B00.9 HERPES SIMPLEX TYPE 2 INFECTION: Primary | ICD-10-CM

## 2023-07-25 PROCEDURE — 99213 OFFICE O/P EST LOW 20 MIN: CPT | Mod: VID | Performed by: NURSE PRACTITIONER

## 2023-07-25 RX ORDER — VALACYCLOVIR HYDROCHLORIDE 500 MG/1
500 TABLET, FILM COATED ORAL 2 TIMES DAILY
Qty: 6 TABLET | Refills: 4 | Status: SHIPPED | OUTPATIENT
Start: 2023-07-25 | End: 2023-07-28

## 2023-07-25 RX ORDER — VALACYCLOVIR HYDROCHLORIDE 500 MG/1
500 TABLET, FILM COATED ORAL DAILY
Qty: 90 TABLET | Refills: 0 | Status: SHIPPED | OUTPATIENT
Start: 2023-07-25 | End: 2024-09-05

## 2023-07-25 NOTE — PROGRESS NOTES
Kyle is a 22 year old who is being evaluated via a billable video visit.      How would you like to obtain your AVS? MyChart  If the video visit is dropped, the invitation should be resent by: Text to cell phone: [phone]  Will anyone else be joining your video visit? No        Assessment & Plan     Herpes simplex type 2 infection  More frequent outbreaks lately.  Will utilize suppressive therapy for the next 3 months.  - valACYclovir (VALTREX) 500 MG tablet; Take 1 tablet (500 mg) by mouth 2 times daily for 3 days  - valACYclovir (VALTREX) 500 MG tablet; Take 1 tablet (500 mg) by mouth daily                 Gloria Cam, CONSTANTINO CNP  [facility]    Subjective   Kyle is a 22 year old, presenting for the following health issues:  Medication Request (Medication for herpes simplex outbreaks)      7/25/2023    11:33 AM   Additional Questions   Roomed by Wanda YING       Hx of herpes.  Now with current outbreak.  Typically only noting outbreaks every 3 months, but now she has had back to back episodes.  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      Review of Systems   Constitutional, HEENT, cardiovascular, pulmonary, gi and gu systems are negative, except as otherwise noted.      Objective           Vitals:  No vitals were obtained today due to virtual visit.    Physical Exam   GENERAL: Healthy, alert and no distress  EYES: Eyes grossly normal to inspection.  No discharge or erythema, or obvious scleral/conjunctival abnormalities.  RESP: No audible wheeze, cough, or visible cyanosis.  No visible retractions or increased work of breathing.    SKIN: Visible skin clear. No significant rash, abnormal pigmentation or lesions.  NEURO: Cranial nerves grossly intact.  Mentation and speech appropriate for age.  PSYCH: Mentation appears normal, affect normal/bright, judgement and insight intact, normal speech and appearance well-groomed.                 Video-Visit Details    Type of service:  Video Visit   Video Start Time: 12:56 PM  Video End Time:1:01 PM    Originating Location (pt. Location): Home    Distant Location (provider location):  On-site  Platform used for Video Visit: Bladimir

## 2024-07-07 ENCOUNTER — HEALTH MAINTENANCE LETTER (OUTPATIENT)
Age: 24
End: 2024-07-07

## 2024-07-19 ENCOUNTER — APPOINTMENT (OUTPATIENT)
Dept: GENERAL RADIOLOGY | Facility: CLINIC | Age: 24
End: 2024-07-19
Attending: BEHAVIOR TECHNICIAN
Payer: COMMERCIAL

## 2024-07-19 ENCOUNTER — HOSPITAL ENCOUNTER (EMERGENCY)
Facility: CLINIC | Age: 24
Discharge: HOME | End: 2024-07-19
Attending: EMERGENCY MEDICINE | Admitting: EMERGENCY MEDICINE
Payer: COMMERCIAL

## 2024-07-19 VITALS
TEMPERATURE: 98.8 F | WEIGHT: 173 LBS | BODY MASS INDEX: 27.15 KG/M2 | OXYGEN SATURATION: 97 % | RESPIRATION RATE: 18 BRPM | HEART RATE: 72 BPM | SYSTOLIC BLOOD PRESSURE: 115 MMHG | HEIGHT: 67 IN | DIASTOLIC BLOOD PRESSURE: 60 MMHG

## 2024-07-19 DIAGNOSIS — M54.9 UPPER BACK PAIN: ICD-10-CM
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
## 2024-07-19 DIAGNOSIS — S16.1XXA CERVICAL STRAIN, INITIAL ENCOUNTER: ICD-10-CM

## 2024-07-19 PROCEDURE — 99283 EMERGENCY DEPT VISIT LOW MDM: CPT

## 2024-07-19 PROCEDURE — 72040 X-RAY EXAM NECK SPINE 2-3 VW: CPT

## 2024-07-19 PROCEDURE — 250N000013 HC RX MED GY IP 250 OP 250 PS 637: Performed by: BEHAVIOR TECHNICIAN

## 2024-07-19 PROCEDURE — 72072 X-RAY EXAM THORAC SPINE 3VWS: CPT

## 2024-07-19 RX ORDER — ACETAMINOPHEN 500 MG
1000 TABLET ORAL ONCE
Status: COMPLETED | OUTPATIENT
Start: 2024-07-19 | End: 2024-07-19

## 2024-07-19 RX ADMIN — ACETAMINOPHEN 1000 MG: 500 TABLET, FILM COATED ORAL at 16:31

## 2024-07-19 ASSESSMENT — ACTIVITIES OF DAILY LIVING (ADL)
ADLS_ACUITY_SCORE: 35
ADLS_ACUITY_SCORE: 35

## 2024-07-19 ASSESSMENT — COLUMBIA-SUICIDE SEVERITY RATING SCALE - C-SSRS
2. HAVE YOU ACTUALLY HAD ANY THOUGHTS OF KILLING YOURSELF IN THE PAST MONTH?: NO
6. HAVE YOU EVER DONE ANYTHING, STARTED TO DO ANYTHING, OR PREPARED TO DO ANYTHING TO END YOUR LIFE?: NO
1. IN THE PAST MONTH, HAVE YOU WISHED YOU WERE DEAD OR WISHED YOU COULD GO TO SLEEP AND NOT WAKE UP?: NO

## 2024-07-19 NOTE — Clinical Note
Kyle Steel was seen and treated in our emergency department on 7/19/2024.  She may return to work on 07/22/2024.  She may return to activities as tolerated.      If you have any questions or concerns, please don't hesitate to call.      Andreas Soares PA-C
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
## 2024-07-19 NOTE — DISCHARGE INSTRUCTIONS
As we discussed, no fractures seen on your xrays today. You can take Tylenol and ibuprofen as needed for pain. I would also recommend icing, and massaging the area to help alleviate the pain. Please follow up with your primary care provider as needed. Please return to the ER with new or worsening symptoms.
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
## 2024-09-04 DIAGNOSIS — B00.9 HERPES SIMPLEX TYPE 2 INFECTION: ICD-10-CM

## 2024-09-05 RX ORDER — VALACYCLOVIR HYDROCHLORIDE 500 MG/1
500 TABLET, FILM COATED ORAL DAILY
Qty: 90 TABLET | Refills: 0 | Status: SHIPPED | OUTPATIENT
Start: 2024-09-05

## 2024-09-05 NOTE — TELEPHONE ENCOUNTER
Unable to LVM requesting a call back for an appt. VM Box Full. Two more attempts will be made.    Ana Gregory  Lead   [facility] [place] [facility]

## 2024-09-06 NOTE — TELEPHONE ENCOUNTER
LVM requesting a call back for an appt. One more attempt will be made.     Ana Gregory  Lead   [facility] [place] [facility]

## 2025-01-16 NOTE — ED TRIAGE NOTES
Ovarian cyst surgery Aug 22nd and IUD placed. Last 2 weeks of spotting, last 2 days large blood clot and heavy bleeding and cramping.       English

## 2025-06-02 ENCOUNTER — OFFICE VISIT (OUTPATIENT)
Dept: OBGYN | Facility: CLINIC | Age: 25
End: 2025-06-02
Attending: STUDENT IN AN ORGANIZED HEALTH CARE EDUCATION/TRAINING PROGRAM
Payer: COMMERCIAL

## 2025-06-02 VITALS
WEIGHT: 175 LBS | DIASTOLIC BLOOD PRESSURE: 76 MMHG | HEIGHT: 67 IN | SYSTOLIC BLOOD PRESSURE: 120 MMHG | HEART RATE: 76 BPM | BODY MASS INDEX: 27.47 KG/M2

## 2025-06-02 DIAGNOSIS — N89.8 VAGINAL ITCHING: ICD-10-CM

## 2025-06-02 DIAGNOSIS — N80.9 ENDOMETRIOSIS: Primary | ICD-10-CM

## 2025-06-02 DIAGNOSIS — Z12.4 CERVICAL CANCER SCREENING: ICD-10-CM

## 2025-06-02 LAB
BACTERIAL VAGINOSIS VAG-IMP: NEGATIVE
CANDIDA DNA VAG QL NAA+PROBE: NOT DETECTED
CANDIDA GLABRATA / CANDIDA KRUSEI DNA: NOT DETECTED
T VAGINALIS DNA VAG QL NAA+PROBE: NOT DETECTED

## 2025-06-02 PROCEDURE — 87624 HPV HI-RISK TYP POOLED RSLT: CPT | Performed by: STUDENT IN AN ORGANIZED HEALTH CARE EDUCATION/TRAINING PROGRAM

## 2025-06-02 PROCEDURE — 81515 NFCT DS BV&VAGINITIS DNA ALG: CPT | Performed by: STUDENT IN AN ORGANIZED HEALTH CARE EDUCATION/TRAINING PROGRAM

## 2025-06-02 PROCEDURE — G0463 HOSPITAL OUTPT CLINIC VISIT: HCPCS | Performed by: STUDENT IN AN ORGANIZED HEALTH CARE EDUCATION/TRAINING PROGRAM

## 2025-06-02 ASSESSMENT — ANXIETY QUESTIONNAIRES
IF YOU CHECKED OFF ANY PROBLEMS ON THIS QUESTIONNAIRE, HOW DIFFICULT HAVE THESE PROBLEMS MADE IT FOR YOU TO DO YOUR WORK, TAKE CARE OF THINGS AT HOME, OR GET ALONG WITH OTHER PEOPLE: NOT DIFFICULT AT ALL
4. TROUBLE RELAXING: MORE THAN HALF THE DAYS
1. FEELING NERVOUS, ANXIOUS, OR ON EDGE: NOT AT ALL
3. WORRYING TOO MUCH ABOUT DIFFERENT THINGS: NOT AT ALL
2. NOT BEING ABLE TO STOP OR CONTROL WORRYING: NOT AT ALL
GAD7 TOTAL SCORE: 3
8. IF YOU CHECKED OFF ANY PROBLEMS, HOW DIFFICULT HAVE THESE MADE IT FOR YOU TO DO YOUR WORK, TAKE CARE OF THINGS AT HOME, OR GET ALONG WITH OTHER PEOPLE?: NOT DIFFICULT AT ALL
5. BEING SO RESTLESS THAT IT IS HARD TO SIT STILL: NOT AT ALL
7. FEELING AFRAID AS IF SOMETHING AWFUL MIGHT HAPPEN: NOT AT ALL
GAD7 TOTAL SCORE: 3
7. FEELING AFRAID AS IF SOMETHING AWFUL MIGHT HAPPEN: NOT AT ALL
6. BECOMING EASILY ANNOYED OR IRRITABLE: SEVERAL DAYS
GAD7 TOTAL SCORE: 3

## 2025-06-02 NOTE — PROGRESS NOTES
[id number], 8055-2674 Nexplanon    2022 Mirena IUD.     Spotting with mIUD. Lots of cramping though, all over. Debilitating. Pretty regular. Recently more sharp pains on left side.     Cramping got worse in 2018.    Takes advil sometimes.     PMHx  - heatlhy    Psx:   - laparoscopic ovarian cystectomy

## 2025-06-02 NOTE — PROGRESS NOTES
[facility]  Gynecology Visit    Reason for Visit: endometriosis    HPI:    Kyle Steel is a 24 year old , here to discuss endometriosis. She was diagnosed with endometriosis during her laparoscopic left ovarian cystectomy for a dermoid in . She was noted to have lesions in her posterior cul-de-sac and bilateral ovarian fossa.     She has always had painful periods, but the cramping worsened in 2018. She had a nexplanon from 9414-3356 and then a 2nd one from 0129-4191. The 2nd one was removed early due to ongoing bleeding. She had a Mirena IUD placed in  at the time of her [facility] left ovarian cystectomy. She does not feel the cramping has improved. She does not have much bleeding with the mIUD - just occasional spotting.    Recently, she has had more pain in her LLQ and she is worried she has another cyst. In 2024 she had an US that showed a low-lying IUD. On exam however, strings appeared to be in the right location and so with shared decision making, she decided to leave the IUD in place.      GYN History  Menses: see above  Pap Smears:   - 2022: Pap test NILM  Contraception: mIUD in place (may be low-lying)    OBHx  OB History    Para Term  AB Living   0 0 0 0 0 0   SAB IAB Ectopic Multiple Live Births   0 0 0 0 0       Past medical history  Past Medical History:   Diagnosis Date    Chlamydia contact, treated 2017    Azithromycin sent to pharmacy. Pt notified to come back in three months for recheck. BHANU JENNINGS    Gonorrhea in female 2019    Scheduled to come in for treatment. Pt notified to come back in three months for a recheck. 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  ap    Patellofemoral disorder of left knee 2017       Past surgical history  Past Surgical History:   Procedure Laterality Date    LAPAROSCOPIC CYSTECTOMY OVARIAN (BENIGN) Left 2022        Medications    Current Outpatient Medications:     levonorgestrel (MIRENA) 52 MG (20 mcg/day) IUD, by Intrauterine route once., Disp: , Rfl:     valACYclovir (VALTREX) 500 MG tablet, TAKE 1 TABLET(500 MG) BY MOUTH DAILY, Disp: 90 tablet, Rfl: 0    ondansetron (ZOFRAN ODT) 4 MG ODT tab, Take 1 tablet (4 mg) by mouth every 8 hours as needed for nausea, Disp: 10 tablet, Rfl: 0    Current Facility-Administered Medications:     medroxyPROGESTERone (DEPO-PROVERA) injection 150 mg, 150 mg, Intramuscular, Q90 Days, Kehinde Odonnell MD, 150 mg at 02/26/20 1201    Allergies  Allergies   Allergen Reactions    Nka [No Known Allergies]        Family history  Family History   Problem Relation Age of Onset    Diabetes Maternal Grandmother     Diabetes Maternal Grandfather     Diabetes Paternal Grandmother     Diabetes Paternal Grandfather     Coronary Artery Disease No family hx of        Social history  Social History     Socioeconomic History    Marital status: Single     Spouse name: Not on file    Number of children: Not on file    Years of education: Not on file    Highest education level: Not on file   Occupational History    Not on file   Tobacco Use    Smoking status: Never     Passive exposure: Yes    Smokeless tobacco: Never   Substance and Sexual Activity    Alcohol use: No    Drug use: No    Sexual activity: Not Currently   Other Topics Concern    Not on file   Social History Narrative    Not on file     Social Drivers of Health     Financial Resource Strain: Medium Risk (4/9/2025)    Received from [facility]    Financial Resource Strain     Difficulty of Paying Living Expenses: 1     Difficulty of Paying Living Expenses: 2   Food Insecurity: No Food Insecurity (4/9/2025)    Received from [facility]    Food Insecurity     Do you worry your food will run out before you are able to buy more?: 1   Transportation Needs: No Transportation Needs  "(2025)    [facility]    Transportation Needs     Does lack of transportation keep you from medical appointments?: 1     Does lack of transportation keep you from work, meetings or getting things that you need?: 1   Physical Activity: Not on file   Stress: Not on file   Social Connections: Socially Isolated (2025)    [facility]    Social Connections     Do you often feel lonely or isolated from those around you?: 4   Interpersonal Safety: Not on file   Housing Stability: Low Risk  (2025)    Received from [facility] [employer]    Housing Stability     What is your housing situation today?: 1       Physical Exam  /76   Pulse 76   Ht 1.702 m (5' 7.01\")   Wt 79.4 kg (175 lb)   BMI 27.40 kg/m    Gen: Well-appearing, NAD  CV:  Well perfused, regular rate  Pulm: Breathing comfortably on room air    Pelvic:  Normal appearing external female genitalia. Normal hair distribution. Vagina is without lesions. Scant discharge. Cervix nulliparous, no lesions, no cervical motion tenderness. IUD strings visualized.       Assessment/Plan:  Kyle Steel is a 24 year old  female here for endometriosis.     Endometriosis  - Was diagnosed in  during surgery  - Discussed options for management of endometriosis and that this is a hormonally mediated condition. Discussed mIUD, OCPs (can do a combo), other progesterone options and hormone suppression with Orilissa or Lupron, though these are generally limited to 2 years. Discussed last option would be surgery with excision/ablation of endometriosis - but would recommend this if medical management failed.  - We will get a pelvic US first to rule out any ovarian cysts and have patient return in 1-2 months to discuss management options. Likely mIUD with continuous OCPs    Cervical cancer screening  - Due for pap smear today, " collected    Return to clinic in 1-2 months    Ileana Colmenares MD  [facility], Ob/Gyn  06/02/2025 5:40 PM

## 2025-06-02 NOTE — PATIENT INSTRUCTIONS
Thank you for trusting us with your care!   Please be aware, if you are on Mychart, you may see your results prior to your providers review. If labs are abnormal, we will call or message you on [facility] with a follow up plan.    If you need to contact us for questions about:  Symptoms, Scheduling & Medical Questions; Non-urgent (2-3 day response) [place] message, Urgent (needing response today) [phone] (if after 3:30pm next day response)   Prescriptions: Please call your Pharmacy   Billing: [place] [phone] or [facility]:[phone]

## 2025-06-02 NOTE — LETTER
2025       RE: Klye Steel  [address]  [place] [address]     Dear Colleague,    Thank you for referring your patient, Kyle Steel, to the [facility] [place] [facility]. Please see a copy of my visit note below.    [facility]  Gynecology Visit    Reason for Visit: endometriosis    HPI:    Kyle Steel is a 24 year old , here to discuss endometriosis. She was diagnosed with endometriosis during her laparoscopic left ovarian cystectomy for a dermoid in . She was noted to have lesions in her posterior cul-de-sac and bilateral ovarian fossa.     She has always had painful periods, but the cramping worsened in 2018. She had a nexplanon from 1378-3272 and then a 2nd one from 2103-4006. The 2nd one was removed early due to ongoing bleeding. She had a Mirena IUD placed in  at the time of her [facility] left ovarian cystectomy. She does not feel the cramping has improved. She does not have much bleeding with the mIUD - just occasional spotting.    Recently, she has had more pain in her LLQ and she is worried she has another cyst. In 2024 she had an US that showed a low-lying IUD. On exam however, strings appeared to be in the right location and so with shared decision making, she decided to leave the IUD in place.      GYN History  Menses: see above  Pap Smears:   - 2022: Pap test NILM  Contraception: mIUD in place (may be low-lying)    OBHx  OB History    Para Term  AB Living   0 0 0 0 0 0   SAB IAB Ectopic Multiple Live Births   0 0 0 0 0       Past medical history  Past Medical History:   Diagnosis Date     Chlamydia contact, treated 2017    Azithromycin sent to pharmacy. Pt notified to come back in three months for recheck. BHANU JENNINGS     Gonorrhea in female 2019    Scheduled to come in for treatment. Pt notified to come back in three months for a recheck. BHANU JENNINGS     LTBI  (latent tuberculosis infection) 12/3/2019    Per MD, pt was treated in 2007 (see phone note dated 11/25/19).  ap     Patellofemoral disorder of left knee 12/1/2017       Past surgical history  Past Surgical History:   Procedure Laterality Date     LAPAROSCOPIC CYSTECTOMY OVARIAN (BENIGN) Left 08/2022       Medications    Current Outpatient Medications:      levonorgestrel (MIRENA) 52 MG (20 mcg/day) IUD, by Intrauterine route once., Disp: , Rfl:      valACYclovir (VALTREX) 500 MG tablet, TAKE 1 TABLET(500 MG) BY MOUTH DAILY, Disp: 90 tablet, Rfl: 0     ondansetron (ZOFRAN ODT) 4 MG ODT tab, Take 1 tablet (4 mg) by mouth every 8 hours as needed for nausea, Disp: 10 tablet, Rfl: 0    Current Facility-Administered Medications:      medroxyPROGESTERone (DEPO-PROVERA) injection 150 mg, 150 mg, Intramuscular, Q90 Days, Kehinde Odonnell MD, 150 mg at 02/26/20 1201    Allergies  Allergies   Allergen Reactions     Nka [No Known Allergies]        Family history  Family History   Problem Relation Age of Onset     Diabetes Maternal Grandmother      Diabetes Maternal Grandfather      Diabetes Paternal Grandmother      Diabetes Paternal Grandfather      Coronary Artery Disease No family hx of        Social history  Social History     Socioeconomic History     Marital status: Single     Spouse name: Not on file     Number of children: Not on file     Years of education: Not on file     Highest education level: Not on file   Occupational History     Not on file   Tobacco Use     Smoking status: Never     Passive exposure: Yes     Smokeless tobacco: Never   Substance and Sexual Activity     Alcohol use: No     Drug use: No     Sexual activity: Not Currently   Other Topics Concern     Not on file   Social History Narrative     Not on file     Social Drivers of Health     Financial Resource Strain: Medium Risk (4/9/2025)    Received from [facility]    Financial Resource Strain      Difficulty of  "Paying Living Expenses: 1      Difficulty of Paying Living Expenses: 2   Food Insecurity: No Food Insecurity (2025)    Received from [facility]    Food Insecurity      Do you worry your food will run out before you are able to buy more?: 1   Transportation Needs: No Transportation Needs (2025)    [facility]    Transportation Needs      Does lack of transportation keep you from medical appointments?: 1      Does lack of transportation keep you from work, meetings or getting things that you need?: 1   Physical Activity: Not on file   Stress: Not on file   Social Connections: Socially Isolated (2025)    [facility]    Social Connections      Do you often feel lonely or isolated from those around you?: 4   Interpersonal Safety: Not on file   Housing Stability: Low Risk  (2025)    Received from [facility]    Housing Stability      What is your housing situation today?: 1       Physical Exam  /76   Pulse 76   Ht 1.702 m (5' 7.01\")   Wt 79.4 kg (175 lb)   BMI 27.40 kg/m    Gen: Well-appearing, NAD  CV:  Well perfused, regular rate  Pulm: Breathing comfortably on room air    Pelvic:  Normal appearing external female genitalia. Normal hair distribution. Vagina is without lesions. Scant discharge. Cervix nulliparous, no lesions, no cervical motion tenderness. IUD strings visualized.       Assessment/Plan:  Kyle Steel is a 24 year old  female here for endometriosis.     Endometriosis  - Was diagnosed in  during surgery  - Discussed options for management of endometriosis and that this is a hormonally mediated condition. Discussed mIUD, OCPs (can do a combo), other progesterone options and hormone suppression with Orilissa or Lupron, though these are generally limited to 2 years. Discussed last option would be surgery with " excision/ablation of endometriosis - but would recommend this if medical management failed.  - We will get a pelvic US first to rule out any ovarian cysts and have patient return in 1-2 months to discuss management options. Likely mIUD with continuous OCPs    Cervical cancer screening  - Due for pap smear today, collected    Return to clinic in 1-2 months    Ileana Colmenares MD  [facility], Ob/Gyn  06/02/2025 5:40 PM        Again, thank you for allowing me to participate in the care of your patient.      Sincerely,    Ileana Colmenares MD

## 2025-06-03 ENCOUNTER — RESULTS FOLLOW-UP (OUTPATIENT)
Dept: OBGYN | Facility: CLINIC | Age: 25
End: 2025-06-03

## 2025-06-03 LAB
HPV HR 12 DNA CVX QL NAA+PROBE: NEGATIVE
HPV16 DNA CVX QL NAA+PROBE: NEGATIVE
HPV18 DNA CVX QL NAA+PROBE: NEGATIVE
HUMAN PAPILLOMA VIRUS FINAL DIAGNOSIS: NORMAL

## 2025-06-04 ENCOUNTER — ANCILLARY PROCEDURE (OUTPATIENT)
Dept: ULTRASOUND IMAGING | Facility: CLINIC | Age: 25
End: 2025-06-04
Attending: STUDENT IN AN ORGANIZED HEALTH CARE EDUCATION/TRAINING PROGRAM
Payer: COMMERCIAL

## 2025-06-04 DIAGNOSIS — N80.9 ENDOMETRIOSIS: ICD-10-CM

## 2025-06-04 PROCEDURE — 76830 TRANSVAGINAL US NON-OB: CPT | Mod: 26 | Performed by: OBSTETRICS & GYNECOLOGY

## 2025-06-04 PROCEDURE — 76856 US EXAM PELVIC COMPLETE: CPT | Mod: 26 | Performed by: OBSTETRICS & GYNECOLOGY

## 2025-06-04 PROCEDURE — 76830 TRANSVAGINAL US NON-OB: CPT

## 2025-06-04 PROCEDURE — 76377 3D RENDER W/INTRP POSTPROCES: CPT

## 2025-06-04 PROCEDURE — 76377 3D RENDER W/INTRP POSTPROCES: CPT | Mod: 26 | Performed by: OBSTETRICS & GYNECOLOGY

## 2025-06-22 ENCOUNTER — MYC MEDICAL ADVICE (OUTPATIENT)
Dept: OBGYN | Facility: CLINIC | Age: 25
End: 2025-06-22
Payer: COMMERCIAL

## 2025-06-22 DIAGNOSIS — Z30.011 ENCOUNTER FOR ORAL CONTRACEPTION INITIAL PRESCRIPTION: Primary | ICD-10-CM

## 2025-06-23 ENCOUNTER — TELEPHONE (OUTPATIENT)
Dept: OBGYN | Facility: CLINIC | Age: 25
End: 2025-06-23
Payer: COMMERCIAL

## 2025-06-23 NOTE — TELEPHONE ENCOUNTER
"Patient notified 6/5/2025 by Dr. Colmenares:   \"Hi Kyle, your IUD is in the wrong spot so I recommend removing it and replacing it if you want another IUD. Since it decreases your bleeding it might be nice to have. We can remove and replace it at your upcoming visit.      We can also talk about other things to help your endometriosis symptoms.      Let me know if you want to replace your IUD!\"   "

## 2025-06-23 NOTE — TELEPHONE ENCOUNTER
Health Call Center    Phone Message    May a detailed message be left on voicemail: yes     Reason for Call: Symptoms or Concerns     If patient has red-flag symptoms, warm transfer to triage line    Current symptom or concern: Incorrectly placed IUD    Has patient previously been seen for this? Yes    By Dr. Colmenares    Patient's IUD is in the wrong spot. She is having terrible pain. Is her IUD still providing her birth control protection? Please review and follow-up. If she is not protected, she would like something to protect her while she is waiting for her appointment.    Could we move her appointment up?    Please review and follow-up. Call back number is [phone].    Action Taken: Message routed to:  Other: [facility]    Travel Screening: Not Applicable

## 2025-06-24 ENCOUNTER — TELEPHONE (OUTPATIENT)
Dept: OBGYN | Facility: CLINIC | Age: 25
End: 2025-06-24
Payer: COMMERCIAL

## 2025-06-24 RX ORDER — DROSPIRENONE AND ETHINYL ESTRADIOL 0.03MG-3MG
1 KIT ORAL DAILY
Qty: 84 TABLET | Refills: 2 | Status: SHIPPED | OUTPATIENT
Start: 2025-06-24

## 2025-06-24 NOTE — TELEPHONE ENCOUNTER
Talked to patient. She is currently scheduled for Dr. Colmenares's first opening and is on her waitlist. Patient requested to speak with RN team to discuss alternate birth control method in the meantime. RN triage requested a TE and will reach out to patient later.

## 2025-06-24 NOTE — TELEPHONE ENCOUNTER
Called Kyle, asked if she had ever used OCP previous to IUD placement. She states she has but does not remember the name but last use was in 2022 and would like to resume it while waiting for next appointment with Dr. Colmenares to remove and replace malpositioned IUD.   Will route to oncall provider to review.     Note from 8/04/2020 visit with FP provider was prescribed drospirenone-ethinyl estradiol ella 3-0.03 MG table until IUD placed in 2022.

## 2025-07-13 ENCOUNTER — HEALTH MAINTENANCE LETTER (OUTPATIENT)
Age: 25
End: 2025-07-13

## 2025-08-06 ENCOUNTER — TELEPHONE (OUTPATIENT)
Dept: OBGYN | Facility: CLINIC | Age: 25
End: 2025-08-06
Payer: COMMERCIAL